# Patient Record
Sex: FEMALE | Race: WHITE | NOT HISPANIC OR LATINO
[De-identification: names, ages, dates, MRNs, and addresses within clinical notes are randomized per-mention and may not be internally consistent; named-entity substitution may affect disease eponyms.]

---

## 2017-02-23 ENCOUNTER — APPOINTMENT (OUTPATIENT)
Dept: SURGICAL ONCOLOGY | Facility: CLINIC | Age: 62
End: 2017-02-23

## 2017-08-16 ENCOUNTER — APPOINTMENT (OUTPATIENT)
Dept: OBGYN | Facility: CLINIC | Age: 62
End: 2017-08-16
Payer: COMMERCIAL

## 2017-08-16 PROCEDURE — 99396 PREV VISIT EST AGE 40-64: CPT

## 2017-08-25 ENCOUNTER — APPOINTMENT (OUTPATIENT)
Dept: RADIOLOGY | Facility: IMAGING CENTER | Age: 62
End: 2017-08-25
Payer: COMMERCIAL

## 2017-08-25 ENCOUNTER — APPOINTMENT (OUTPATIENT)
Dept: ULTRASOUND IMAGING | Facility: IMAGING CENTER | Age: 62
End: 2017-08-25
Payer: COMMERCIAL

## 2017-08-25 ENCOUNTER — OUTPATIENT (OUTPATIENT)
Dept: OUTPATIENT SERVICES | Facility: HOSPITAL | Age: 62
LOS: 1 days | End: 2017-08-25
Payer: COMMERCIAL

## 2017-08-25 DIAGNOSIS — Z00.8 ENCOUNTER FOR OTHER GENERAL EXAMINATION: ICD-10-CM

## 2017-08-25 PROCEDURE — 76536 US EXAM OF HEAD AND NECK: CPT

## 2017-08-25 PROCEDURE — 77080 DXA BONE DENSITY AXIAL: CPT

## 2017-08-25 PROCEDURE — 76536 US EXAM OF HEAD AND NECK: CPT | Mod: 26

## 2017-08-25 PROCEDURE — 77080 DXA BONE DENSITY AXIAL: CPT | Mod: 26

## 2017-09-05 DIAGNOSIS — E04.2 NONTOXIC MULTINODULAR GOITER: ICD-10-CM

## 2017-09-05 DIAGNOSIS — M81.0 AGE-RELATED OSTEOPOROSIS WITHOUT CURRENT PATHOLOGICAL FRACTURE: ICD-10-CM

## 2017-09-11 ENCOUNTER — FORM ENCOUNTER (OUTPATIENT)
Age: 62
End: 2017-09-11

## 2017-09-12 ENCOUNTER — APPOINTMENT (OUTPATIENT)
Dept: ULTRASOUND IMAGING | Facility: IMAGING CENTER | Age: 62
End: 2017-09-12
Payer: COMMERCIAL

## 2017-09-12 ENCOUNTER — APPOINTMENT (OUTPATIENT)
Dept: MAMMOGRAPHY | Facility: IMAGING CENTER | Age: 62
End: 2017-09-12
Payer: COMMERCIAL

## 2017-09-12 ENCOUNTER — OUTPATIENT (OUTPATIENT)
Dept: OUTPATIENT SERVICES | Facility: HOSPITAL | Age: 62
LOS: 1 days | End: 2017-09-12
Payer: COMMERCIAL

## 2017-09-12 DIAGNOSIS — D24.2 BENIGN NEOPLASM OF LEFT BREAST: ICD-10-CM

## 2017-09-12 DIAGNOSIS — R92.2 INCONCLUSIVE MAMMOGRAM: ICD-10-CM

## 2017-09-12 PROCEDURE — 76641 ULTRASOUND BREAST COMPLETE: CPT | Mod: 26,50

## 2017-09-12 PROCEDURE — G0204: CPT | Mod: 26

## 2017-09-12 PROCEDURE — 76641 ULTRASOUND BREAST COMPLETE: CPT

## 2017-09-12 PROCEDURE — G0279: CPT | Mod: 26

## 2017-09-12 PROCEDURE — 77066 DX MAMMO INCL CAD BI: CPT

## 2017-09-12 PROCEDURE — G0279: CPT

## 2017-11-19 ENCOUNTER — EMERGENCY (EMERGENCY)
Facility: HOSPITAL | Age: 62
LOS: 1 days | Discharge: HOME | End: 2017-11-19
Attending: PERSONAL EMERGENCY RESPONSE ATTENDANT | Admitting: PERSONAL EMERGENCY RESPONSE ATTENDANT
Payer: COMMERCIAL

## 2017-11-19 VITALS
HEART RATE: 97 BPM | TEMPERATURE: 98 F | RESPIRATION RATE: 17 BRPM | OXYGEN SATURATION: 100 % | DIASTOLIC BLOOD PRESSURE: 78 MMHG | SYSTOLIC BLOOD PRESSURE: 117 MMHG

## 2017-11-19 PROCEDURE — 99284 EMERGENCY DEPT VISIT MOD MDM: CPT | Mod: 25

## 2017-11-19 PROCEDURE — 73552 X-RAY EXAM OF FEMUR 2/>: CPT | Mod: 26,LT

## 2017-11-19 PROCEDURE — 73562 X-RAY EXAM OF KNEE 3: CPT

## 2017-11-19 PROCEDURE — 73590 X-RAY EXAM OF LOWER LEG: CPT

## 2017-11-19 PROCEDURE — 73552 X-RAY EXAM OF FEMUR 2/>: CPT

## 2017-11-19 PROCEDURE — 73562 X-RAY EXAM OF KNEE 3: CPT | Mod: 26,LT

## 2017-11-19 PROCEDURE — 73590 X-RAY EXAM OF LOWER LEG: CPT | Mod: 26,LT

## 2017-11-19 RX ORDER — OXYCODONE HYDROCHLORIDE 5 MG/1
1 TABLET ORAL
Qty: 12 | Refills: 0 | OUTPATIENT
Start: 2017-11-19 | End: 2017-11-22

## 2017-11-19 RX ORDER — OXYCODONE HYDROCHLORIDE 5 MG/1
5 TABLET ORAL ONCE
Qty: 0 | Refills: 0 | Status: DISCONTINUED | OUTPATIENT
Start: 2017-11-19 | End: 2017-11-19

## 2017-11-19 RX ORDER — ACETAMINOPHEN 500 MG
975 TABLET ORAL ONCE
Qty: 0 | Refills: 0 | Status: COMPLETED | OUTPATIENT
Start: 2017-11-19 | End: 2017-11-19

## 2017-11-19 RX ADMIN — OXYCODONE HYDROCHLORIDE 5 MILLIGRAM(S): 5 TABLET ORAL at 10:23

## 2017-11-19 RX ADMIN — Medication 975 MILLIGRAM(S): at 08:23

## 2017-11-19 NOTE — ED PROVIDER NOTE - OBJECTIVE STATEMENT
Attending MD Diaz.  Pt is a 62 yr old female presenting to ED with complaint of 'knee twist' injury yesterday afternoon.  She reports she was coming down stairs and twisted her L knee.  She remains neurovascularly intact distal to site but is now not able to ambulate or bend knee.  Pt has severe TTP on palpation of L medial knee superior and inferior to joint space.  No gross effusion, no bruising/erythema noted.  Pt has chronic 'knee issues'.  She gets knee injections in bilateral knees for 'lubrication' performed by her orthopedist Dr. Jared Donis.  She denies recent fevers/chills and had actually been remarking how well her knee felt prior to this event yesterday.  No other injuries.  NO fall/head injury.  No pain in L hip/pelvis.  PT's LLE remains neurovascularly intact distal to site.  Unable to actively/passively range L knee.  When attempt at passive ROM pt's L knee feels locked and unable to bend.  No focal TTP over patella.  Unable to assess for laxity.

## 2017-11-19 NOTE — ED PROVIDER NOTE - MEDICAL DECISION MAKING DETAILS
[employer]:  Pt refuses pain medication other than tylenol 2/2 'angioedema' with most medications.  Inability to range knee at all.  No gross deformity.  Plan to obtain XR's and consult ortho for assessment given severely limited ROM.

## 2017-11-19 NOTE — ED ADULT NURSE NOTE - CHPI ED SYMPTOMS NEG
no loss of consciousness/no abrasion/no numbness/no fever/no bleeding/no tingling/no confusion/no vomiting

## 2017-11-19 NOTE — ED PROVIDER NOTE - PROGRESS NOTE DETAILS
[employer]:  Orthopedics consulted re: inability to range L knee at all with high-riding patella on XR.  Pt remains neurovasc intact.  Formal consult requested from orthopedics. [employer]:  Prolonged discussion with pt re: her anaphylaxis hx.  She's had percocet previously without anaphylaxis/allergy.  Pt amenable to oxycodone for added pain control.  Placed in knee immobilizer according to procedure note assoc with this visit.  XR's without gross fx.  Ortho has seen pt and recommends knee immobilizer x 1 wk with follow-up with Dr. Donis.  Pt advised to use tylenol/oxycodone for pain control, rest, ice, elevate.  Return to ED for new worsening sxs including sig swelling, fevers/chills/new/worsening pain.  No current erythema/edema/increased warmth.  Stable for discharge.

## 2017-11-19 NOTE — ED PROCEDURE NOTE - PROCEDURE ADDITIONAL DETAILS
Pt placed in L knee immobilizer and fitted to comfort.  Remains neurovascularly intact distal to site following placement.  Crutches fitted to pt.  Able to ambulate with crutches.

## 2017-11-19 NOTE — CONSULT NOTE ADULT - ASSESSMENT
A: 63 yo Female with L knee pain, likely acute on chronic arthritic pain    -- WBAT LLE  -- Pain control, NSAIDs, rest, ice, compression  -- No acute ortho intervention  -- FU with Dr. Donis in 1-2 weeks after discharge

## 2017-11-19 NOTE — CONSULT NOTE ADULT - SUBJECTIVE AND OBJECTIVE BOX
CC: L wrist pain    HPI: Pt is a 61 yo Female who twisted her leg walking down stairs and developed L knee pain/swelling worsening with ambulation/activity over the past 2 days..  No numbness/tingling/head trauma/other injury.    PMH:  Herpes simplex type 1 infection  Gastroesophageal reflux disease, esophagitis presence not specified  Alopecia      Exam: 61 yo Female in NAD, AAOx3  LLE: SP/DP/S SILT, motor intact TA/GS/EHL, foot WWP, skin intact. + mild effusion L knee, no erythema, no e/o ligamentous laxity, + medial JLT    Xrays: negative for fracture or dislocation

## 2017-11-19 NOTE — ED PROVIDER NOTE - PHYSICAL EXAMINATION
She remains neurovascularly intact distal to site of L knee injury. Pt has severe TTP on palpation of L medial knee superior and inferior to joint space.  No gross effusion, no bruising/erythema noted.   No pain in L hip/pelvis.   Unable to actively/passively range L knee.  When attempt at passive ROM pt's L knee feels locked and unable to bend.  No focal TTP over patella.  Unable to assess for laxity.

## 2017-11-19 NOTE — ED ADULT NURSE NOTE - OBJECTIVE STATEMENT
62 y.o. female presents to ED via EMS 62 y.o. female presents to ED via wheel chair through waiting room c/o left knee pain. History of arthritis & "fluid on knee", sees Dr. EDIL Donis to get rooster comb injections in bilateral knees (last injection was in august). States she had a fall yesterday afternoon down approximately 3 stairs, lost her footing and twisted to catch her self, landed on left knee. Has not taken any pain medication, is wearing knee brace she had at home which has not helped relieve pain. Mild swelling noted. Denies pain at rest, difficult to move, walk or bare weight, pain to palpation of medial aspect of left knee. Denies hitting head/LOC. no bleeding, lacerations, abrasions or bruising noted. Denies HA, CP, SOB, abd pain, NVD, fevers, chills. Patient is awake, alert, a&ox3, following commands, sensory in tact.

## 2017-11-19 NOTE — ED ADULT NURSE NOTE - PMH
Alopecia    Gastroesophageal reflux disease, esophagitis presence not specified    Herpes simplex type 1 infection

## 2018-04-03 ENCOUNTER — APPOINTMENT (OUTPATIENT)
Dept: OBGYN | Facility: CLINIC | Age: 63
End: 2018-04-03
Payer: MEDICARE

## 2018-04-03 PROCEDURE — 99213 OFFICE O/P EST LOW 20 MIN: CPT

## 2018-04-06 ENCOUNTER — APPOINTMENT (OUTPATIENT)
Dept: CT IMAGING | Facility: IMAGING CENTER | Age: 63
End: 2018-04-06

## 2018-04-06 ENCOUNTER — OUTPATIENT (OUTPATIENT)
Dept: OUTPATIENT SERVICES | Facility: HOSPITAL | Age: 63
LOS: 1 days | End: 2018-04-06
Payer: MEDICARE

## 2018-04-06 DIAGNOSIS — Z00.8 ENCOUNTER FOR OTHER GENERAL EXAMINATION: ICD-10-CM

## 2018-04-06 PROCEDURE — 82565 ASSAY OF CREATININE: CPT

## 2018-04-06 PROCEDURE — 74177 CT ABD & PELVIS W/CONTRAST: CPT | Mod: 26

## 2018-04-06 PROCEDURE — 74177 CT ABD & PELVIS W/CONTRAST: CPT

## 2018-04-13 ENCOUNTER — APPOINTMENT (OUTPATIENT)
Dept: ULTRASOUND IMAGING | Facility: IMAGING CENTER | Age: 63
End: 2018-04-13
Payer: MEDICARE

## 2018-04-13 ENCOUNTER — APPOINTMENT (OUTPATIENT)
Dept: MAMMOGRAPHY | Facility: IMAGING CENTER | Age: 63
End: 2018-04-13
Payer: MEDICARE

## 2018-04-13 ENCOUNTER — OUTPATIENT (OUTPATIENT)
Dept: OUTPATIENT SERVICES | Facility: HOSPITAL | Age: 63
LOS: 1 days | End: 2018-04-13
Payer: MEDICARE

## 2018-04-13 DIAGNOSIS — Z00.8 ENCOUNTER FOR OTHER GENERAL EXAMINATION: ICD-10-CM

## 2018-04-13 PROCEDURE — 77065 DX MAMMO INCL CAD UNI: CPT | Mod: 26,LT

## 2018-04-13 PROCEDURE — 76641 ULTRASOUND BREAST COMPLETE: CPT | Mod: 26,50

## 2018-04-13 PROCEDURE — 76641 ULTRASOUND BREAST COMPLETE: CPT

## 2018-04-13 PROCEDURE — 77065 DX MAMMO INCL CAD UNI: CPT

## 2018-04-13 PROCEDURE — G0279: CPT

## 2018-04-13 PROCEDURE — G0279: CPT | Mod: 26

## 2018-04-18 ENCOUNTER — APPOINTMENT (OUTPATIENT)
Dept: MAMMOGRAPHY | Facility: IMAGING CENTER | Age: 63
End: 2018-04-18

## 2018-04-18 ENCOUNTER — OUTPATIENT (OUTPATIENT)
Dept: OUTPATIENT SERVICES | Facility: HOSPITAL | Age: 63
LOS: 1 days | End: 2018-04-18
Payer: MEDICARE

## 2018-04-18 DIAGNOSIS — Z00.8 ENCOUNTER FOR OTHER GENERAL EXAMINATION: ICD-10-CM

## 2018-04-18 PROCEDURE — 19081 BX BREAST 1ST LESION STRTCTC: CPT

## 2018-04-18 PROCEDURE — 19081 BX BREAST 1ST LESION STRTCTC: CPT | Mod: 53

## 2018-10-01 ENCOUNTER — APPOINTMENT (OUTPATIENT)
Dept: MAMMOGRAPHY | Facility: IMAGING CENTER | Age: 63
End: 2018-10-01
Payer: MEDICARE

## 2018-10-01 ENCOUNTER — OUTPATIENT (OUTPATIENT)
Dept: OUTPATIENT SERVICES | Facility: HOSPITAL | Age: 63
LOS: 1 days | End: 2018-10-01
Payer: MEDICARE

## 2018-10-01 ENCOUNTER — APPOINTMENT (OUTPATIENT)
Dept: ULTRASOUND IMAGING | Facility: IMAGING CENTER | Age: 63
End: 2018-10-01
Payer: MEDICARE

## 2018-10-01 DIAGNOSIS — Z00.8 ENCOUNTER FOR OTHER GENERAL EXAMINATION: ICD-10-CM

## 2018-10-01 PROCEDURE — G0279: CPT | Mod: 26

## 2018-10-01 PROCEDURE — 76641 ULTRASOUND BREAST COMPLETE: CPT

## 2018-10-01 PROCEDURE — 77066 DX MAMMO INCL CAD BI: CPT | Mod: 26

## 2018-10-01 PROCEDURE — 76641 ULTRASOUND BREAST COMPLETE: CPT | Mod: 26,50

## 2018-10-01 PROCEDURE — 77066 DX MAMMO INCL CAD BI: CPT

## 2018-10-01 PROCEDURE — G0279: CPT

## 2018-10-08 ENCOUNTER — OUTPATIENT (OUTPATIENT)
Dept: OUTPATIENT SERVICES | Facility: HOSPITAL | Age: 63
LOS: 1 days | End: 2018-10-08
Payer: MEDICARE

## 2018-10-08 ENCOUNTER — APPOINTMENT (OUTPATIENT)
Dept: MRI IMAGING | Facility: IMAGING CENTER | Age: 63
End: 2018-10-08
Payer: MEDICARE

## 2018-10-08 DIAGNOSIS — R92.8 OTHER ABNORMAL AND INCONCLUSIVE FINDINGS ON DIAGNOSTIC IMAGING OF BREAST: ICD-10-CM

## 2018-10-08 PROCEDURE — 0159T: CPT | Mod: 26

## 2018-10-08 PROCEDURE — 82565 ASSAY OF CREATININE: CPT

## 2018-10-08 PROCEDURE — C8908: CPT

## 2018-10-08 PROCEDURE — 77059 MRI BREAST BILATERAL: CPT | Mod: 26

## 2018-10-08 PROCEDURE — A9585: CPT

## 2018-10-08 PROCEDURE — C8937: CPT

## 2018-10-11 ENCOUNTER — OUTPATIENT (OUTPATIENT)
Dept: OUTPATIENT SERVICES | Facility: HOSPITAL | Age: 63
LOS: 1 days | End: 2018-10-11
Payer: MEDICARE

## 2018-10-11 ENCOUNTER — APPOINTMENT (OUTPATIENT)
Dept: ULTRASOUND IMAGING | Facility: IMAGING CENTER | Age: 63
End: 2018-10-11
Payer: MEDICARE

## 2018-10-11 DIAGNOSIS — Z00.8 ENCOUNTER FOR OTHER GENERAL EXAMINATION: ICD-10-CM

## 2018-10-11 DIAGNOSIS — E04.2 NONTOXIC MULTINODULAR GOITER: ICD-10-CM

## 2018-10-11 PROCEDURE — 76536 US EXAM OF HEAD AND NECK: CPT | Mod: 26

## 2018-10-11 PROCEDURE — 76536 US EXAM OF HEAD AND NECK: CPT

## 2018-11-15 ENCOUNTER — APPOINTMENT (OUTPATIENT)
Dept: SURGICAL ONCOLOGY | Facility: CLINIC | Age: 63
End: 2018-11-15
Payer: MEDICARE

## 2018-11-15 VITALS
WEIGHT: 140 LBS | SYSTOLIC BLOOD PRESSURE: 108 MMHG | DIASTOLIC BLOOD PRESSURE: 70 MMHG | HEART RATE: 88 BPM | OXYGEN SATURATION: 97 % | BODY MASS INDEX: 25.76 KG/M2 | HEIGHT: 62 IN | RESPIRATION RATE: 14 BRPM

## 2018-11-15 PROCEDURE — 99213 OFFICE O/P EST LOW 20 MIN: CPT

## 2018-11-20 ENCOUNTER — APPOINTMENT (OUTPATIENT)
Dept: OBGYN | Facility: CLINIC | Age: 63
End: 2018-11-20

## 2019-04-18 ENCOUNTER — APPOINTMENT (OUTPATIENT)
Dept: MAMMOGRAPHY | Facility: IMAGING CENTER | Age: 64
End: 2019-04-18
Payer: MEDICARE

## 2019-04-18 ENCOUNTER — APPOINTMENT (OUTPATIENT)
Dept: ULTRASOUND IMAGING | Facility: IMAGING CENTER | Age: 64
End: 2019-04-18
Payer: MEDICARE

## 2019-04-18 ENCOUNTER — OUTPATIENT (OUTPATIENT)
Dept: OUTPATIENT SERVICES | Facility: HOSPITAL | Age: 64
LOS: 1 days | End: 2019-04-18
Payer: MEDICARE

## 2019-04-18 DIAGNOSIS — R92.8 OTHER ABNORMAL AND INCONCLUSIVE FINDINGS ON DIAGNOSTIC IMAGING OF BREAST: ICD-10-CM

## 2019-04-18 PROCEDURE — 77065 DX MAMMO INCL CAD UNI: CPT

## 2019-04-18 PROCEDURE — G0279: CPT

## 2019-04-18 PROCEDURE — G0279: CPT | Mod: 26

## 2019-04-18 PROCEDURE — 76641 ULTRASOUND BREAST COMPLETE: CPT | Mod: 26,LT

## 2019-04-18 PROCEDURE — 77065 DX MAMMO INCL CAD UNI: CPT | Mod: 26,LT

## 2019-04-18 PROCEDURE — 76641 ULTRASOUND BREAST COMPLETE: CPT

## 2019-05-24 ENCOUNTER — APPOINTMENT (OUTPATIENT)
Dept: OTOLARYNGOLOGY | Facility: CLINIC | Age: 64
End: 2019-05-24
Payer: MEDICARE

## 2019-05-24 VITALS
HEIGHT: 62 IN | SYSTOLIC BLOOD PRESSURE: 114 MMHG | BODY MASS INDEX: 25.76 KG/M2 | WEIGHT: 140 LBS | DIASTOLIC BLOOD PRESSURE: 70 MMHG | HEART RATE: 79 BPM

## 2019-05-24 PROCEDURE — 69210 REMOVE IMPACTED EAR WAX UNI: CPT

## 2019-05-24 PROCEDURE — 99214 OFFICE O/P EST MOD 30 MIN: CPT | Mod: 25

## 2019-05-24 NOTE — HISTORY OF PRESENT ILLNESS
[de-identified] : Patient is here today after being advised that she had issues with wax in the ears. She does not have any pain in the ears but may need a hearing test although does not have any time today. SHe does not have any current nasal congsetion or runny nose. SHe is overall doing well. She  does not clean her ears at home.

## 2019-05-24 NOTE — ASSESSMENT
[FreeTextEntry1] : Patient follows up mass of cerumen impaction right ear was curetted out will followup in the future for a hearing test otherwise will follow up annually

## 2019-08-22 ENCOUNTER — FORM ENCOUNTER (OUTPATIENT)
Age: 64
End: 2019-08-22

## 2019-08-23 ENCOUNTER — APPOINTMENT (OUTPATIENT)
Dept: OBGYN | Facility: CLINIC | Age: 64
End: 2019-08-23
Payer: MEDICARE

## 2019-08-23 ENCOUNTER — RESULT REVIEW (OUTPATIENT)
Age: 64
End: 2019-08-23

## 2019-08-23 PROCEDURE — G0101: CPT

## 2019-08-25 ENCOUNTER — FORM ENCOUNTER (OUTPATIENT)
Age: 64
End: 2019-08-25

## 2019-08-26 ENCOUNTER — APPOINTMENT (OUTPATIENT)
Dept: ALLERGY | Facility: CLINIC | Age: 64
End: 2019-08-26
Payer: MEDICARE

## 2019-08-26 VITALS
OXYGEN SATURATION: 98 % | BODY MASS INDEX: 25.76 KG/M2 | SYSTOLIC BLOOD PRESSURE: 110 MMHG | HEART RATE: 82 BPM | RESPIRATION RATE: 14 BRPM | DIASTOLIC BLOOD PRESSURE: 73 MMHG | WEIGHT: 140 LBS | HEIGHT: 62 IN

## 2019-08-26 PROCEDURE — 99204 OFFICE O/P NEW MOD 45 MIN: CPT

## 2019-08-26 NOTE — PHYSICAL EXAM
[Alert] : alert [Well Nourished] : well nourished [Healthy Appearance] : healthy appearance [No Acute Distress] : no acute distress [Normal Pupil & Iris Size/Symmetry] : normal pupil and iris size and symmetry [Well Developed] : well developed [No Discharge] : no discharge [No Photophobia] : no photophobia [Sclera Not Icteric] : sclera not icteric [Normal Nasal Mucosa] : the nasal mucosa was normal [Normal Lips/Tongue] : the lips and tongue were normal [Normal Tonsils] : normal tonsils [Normal Dentition] : normal dentition [No Oral Lesions or Ulcers] : no oral lesions or ulcers [No Neck Mass] : no neck mass was observed [No LAD] : no lymphadenopathy [No Thyroid Mass] : no thyroid mass [Supple] : the neck was supple [Normal Rate and Effort] : normal respiratory rhythm and effort [No Crackles] : no crackles [Bilateral Audible Breath Sounds] : bilateral audible breath sounds [Normal S1, S2] : normal S1 and S2 [Normal Rate] : heart rate was normal  [No murmur] : no murmur [Regular Rhythm] : with a regular rhythm [Normal Cervical Lymph Nodes] : cervical [No Rash] : no rash [Skin Intact] : skin intact  [No clubbing] : no clubbing [No Skin Lesions] : no skin lesions [No Joint Swelling or Erythema] : no joint swelling or erythema [No Edema] : no edema [No Cyanosis] : no cyanosis [Normal Affect] : affect was normal [Normal Mood] : mood was normal [Alert, Awake, Oriented as Age-Appropriate] : alert, awake, oriented as age appropriate

## 2019-09-23 ENCOUNTER — APPOINTMENT (OUTPATIENT)
Dept: ALLERGY | Facility: CLINIC | Age: 64
End: 2019-09-23
Payer: MEDICARE

## 2019-09-23 VITALS
DIASTOLIC BLOOD PRESSURE: 79 MMHG | HEART RATE: 77 BPM | HEIGHT: 62 IN | RESPIRATION RATE: 14 BRPM | SYSTOLIC BLOOD PRESSURE: 100 MMHG | OXYGEN SATURATION: 97 % | BODY MASS INDEX: 25.76 KG/M2 | WEIGHT: 140 LBS

## 2019-09-23 PROCEDURE — 99214 OFFICE O/P EST MOD 30 MIN: CPT

## 2019-09-23 RX ORDER — SPIRONOLACTONE 50 MG/1
TABLET ORAL
Refills: 0 | Status: ACTIVE | COMMUNITY

## 2019-09-23 NOTE — HISTORY OF PRESENT ILLNESS
[Asthma] : asthma [Eczematous rashes] : eczematous rashes [Allergic Rhinitis] : allergic rhinitis [Venom Reactions] : venom reactions [Food Allergies] : food allergies [de-identified] : No episodes of swelling since starting Zyrtec 20 mg BID - she has had some mild itching on her flanks which resolves with topical steroid creams.

## 2019-09-23 NOTE — ASSESSMENT
[FreeTextEntry1] : Idiopathic angioedema well controlled with Zyrtec 20 mg BID:\par \par Patient to continue with present medical regimen\par RV 3 months.

## 2019-09-23 NOTE — PHYSICAL EXAM
[Alert] : alert [Well Nourished] : well nourished [Healthy Appearance] : healthy appearance [No Acute Distress] : no acute distress [Well Developed] : well developed [Normal Pupil & Iris Size/Symmetry] : normal pupil and iris size and symmetry [No Discharge] : no discharge [No Photophobia] : no photophobia [Sclera Not Icteric] : sclera not icteric [Normal Nasal Mucosa] : the nasal mucosa was normal [Normal Lips/Tongue] : the lips and tongue were normal [Normal Tonsils] : normal tonsils [Normal Dentition] : normal dentition [No Neck Mass] : no neck mass was observed [No Oral Lesions or Ulcers] : no oral lesions or ulcers [No LAD] : no lymphadenopathy [No Thyroid Mass] : no thyroid mass [Supple] : the neck was supple [Normal Rate and Effort] : normal respiratory rhythm and effort [No Crackles] : no crackles [Bilateral Audible Breath Sounds] : bilateral audible breath sounds [Normal Rate] : heart rate was normal  [Normal S1, S2] : normal S1 and S2 [No murmur] : no murmur [Regular Rhythm] : with a regular rhythm [Normal Cervical Lymph Nodes] : cervical [Skin Intact] : skin intact  [No Rash] : no rash [No Joint Swelling or Erythema] : no joint swelling or erythema [No Skin Lesions] : no skin lesions [No clubbing] : no clubbing [No Edema] : no edema [No Cyanosis] : no cyanosis [Normal Mood] : mood was normal [Normal Affect] : affect was normal [Alert, Awake, Oriented as Age-Appropriate] : alert, awake, oriented as age appropriate

## 2019-09-27 NOTE — CONSULT LETTER
[Thank you for referring [unfilled] for consultation for _____] : Thank you for referring [unfilled] for consultation for [unfilled] [Dear  ___] : Dear  [unfilled], [Please see my note below.] : Please see my note below. [Sincerely,] : Sincerely, [FreeTextEntry3] : Mitchell B. Boxer, M.D., FAAAAI\par [facility]\par \par Department of Allergy-Immunology\par [facility] \par [address] [place] [address]\par [place] [address]\par Tel:   [phone]\par Fax:  [phone]\par Email: [email or web address]\par

## 2019-09-27 NOTE — ASSESSMENT
[FreeTextEntry1] : Recurrent angioedema:\par \par Continue Zyrtec 20 mg BID x 4 weeks \par Patient can take Avodart or spironolactone \par RV 1 month

## 2019-12-02 ENCOUNTER — APPOINTMENT (OUTPATIENT)
Dept: ALLERGY | Facility: CLINIC | Age: 64
End: 2019-12-02
Payer: MEDICARE

## 2019-12-02 DIAGNOSIS — Z23 ENCOUNTER FOR IMMUNIZATION: ICD-10-CM

## 2019-12-02 PROCEDURE — 99214 OFFICE O/P EST MOD 30 MIN: CPT | Mod: 25

## 2019-12-02 PROCEDURE — G0008: CPT

## 2019-12-02 PROCEDURE — 90662 IIV NO PRSV INCREASED AG IM: CPT

## 2019-12-02 NOTE — HISTORY OF PRESENT ILLNESS
[Asthma] : asthma [Allergic Rhinitis] : allergic rhinitis [Eczematous rashes] : eczematous rashes [Venom Reactions] : venom reactions [Food Allergies] : food allergies [de-identified] : Patient has been taking Zyrtec 20 mg BID ongoing with no recurrent episodes of swelling.   She is taking spironolactone with no problems.  Feeling extremely well.

## 2019-12-02 NOTE — PHYSICAL EXAM
[Alert] : alert [Well Nourished] : well nourished [Healthy Appearance] : healthy appearance [No Acute Distress] : no acute distress [Well Developed] : well developed [Normal Pupil & Iris Size/Symmetry] : normal pupil and iris size and symmetry [No Discharge] : no discharge [No Photophobia] : no photophobia [Sclera Not Icteric] : sclera not icteric [Normal Nasal Mucosa] : the nasal mucosa was normal [Normal Lips/Tongue] : the lips and tongue were normal [Normal Tonsils] : normal tonsils [Normal Dentition] : normal dentition [No Oral Lesions or Ulcers] : no oral lesions or ulcers [No LAD] : no lymphadenopathy [No Neck Mass] : no neck mass was observed [No Thyroid Mass] : no thyroid mass [Supple] : the neck was supple [Normal Rate and Effort] : normal respiratory rhythm and effort [No Crackles] : no crackles [Bilateral Audible Breath Sounds] : bilateral audible breath sounds [Normal Rate] : heart rate was normal  [Normal S1, S2] : normal S1 and S2 [No murmur] : no murmur [Regular Rhythm] : with a regular rhythm [Normal Cervical Lymph Nodes] : cervical [Skin Intact] : skin intact  [No Rash] : no rash [No Skin Lesions] : no skin lesions [No Joint Swelling or Erythema] : no joint swelling or erythema [No clubbing] : no clubbing [No Edema] : no edema [No Cyanosis] : no cyanosis [Normal Mood] : mood was normal [Normal Affect] : affect was normal [Alert, Awake, Oriented as Age-Appropriate] : alert, awake, oriented as age appropriate

## 2019-12-02 NOTE — ASSESSMENT
[FreeTextEntry1] : Idiopathic angioedema:\par \par Lower Zyrtec to 10 mg BID\par Flu shot today\par RV 4 months or prn

## 2019-12-18 ENCOUNTER — APPOINTMENT (OUTPATIENT)
Dept: ALLERGY | Facility: CLINIC | Age: 64
End: 2019-12-18
Payer: MEDICARE

## 2019-12-18 VITALS
SYSTOLIC BLOOD PRESSURE: 110 MMHG | WEIGHT: 140 LBS | BODY MASS INDEX: 25.76 KG/M2 | DIASTOLIC BLOOD PRESSURE: 80 MMHG | HEART RATE: 72 BPM | HEIGHT: 62 IN

## 2019-12-18 PROCEDURE — 99214 OFFICE O/P EST MOD 30 MIN: CPT

## 2019-12-18 RX ORDER — HYDROXYZINE HYDROCHLORIDE 25 MG/1
25 TABLET ORAL
Qty: 90 | Refills: 1 | Status: ACTIVE | COMMUNITY
Start: 2019-12-18 | End: 1900-01-01

## 2019-12-18 NOTE — PHYSICAL EXAM
[Alert] : alert [Well Nourished] : well nourished [No Acute Distress] : no acute distress [Healthy Appearance] : healthy appearance [Well Developed] : well developed [Normal Pupil & Iris Size/Symmetry] : normal pupil and iris size and symmetry [No Discharge] : no discharge [No Photophobia] : no photophobia [Normal Nasal Mucosa] : the nasal mucosa was normal [Sclera Not Icteric] : sclera not icteric [Normal Lips/Tongue] : the lips and tongue were normal [Normal Dentition] : normal dentition [Normal Tonsils] : normal tonsils [No Neck Mass] : no neck mass was observed [No LAD] : no lymphadenopathy [No Oral Lesions or Ulcers] : no oral lesions or ulcers [No Thyroid Mass] : no thyroid mass [Normal Rate and Effort] : normal respiratory rhythm and effort [Supple] : the neck was supple [No Crackles] : no crackles [Bilateral Audible Breath Sounds] : bilateral audible breath sounds [No murmur] : no murmur [Normal Rate] : heart rate was normal  [Normal S1, S2] : normal S1 and S2 [Normal Cervical Lymph Nodes] : cervical [Regular Rhythm] : with a regular rhythm [Skin Intact] : skin intact  [No Rash] : no rash [No Skin Lesions] : no skin lesions [No clubbing] : no clubbing [No Joint Swelling or Erythema] : no joint swelling or erythema [No Edema] : no edema [No Cyanosis] : no cyanosis [Normal Affect] : affect was normal [Normal Mood] : mood was normal [Alert, Awake, Oriented as Age-Appropriate] : alert, awake, oriented as age appropriate

## 2020-01-08 NOTE — ASSESSMENT
[FreeTextEntry1] : Idiopathic angioedema not controlled with very high dose Zyrtec (up to 20 mg QID): \par \par Zyrtec 20 mg BID \par Will need to add additional antihistamines in order to prevent a more severe episode of angioedema - patient to begin hydroxyzine 25 mg QHS \par RV 2 months or prn

## 2020-01-08 NOTE — HISTORY OF PRESENT ILLNESS
[Asthma] : asthma [Allergic Rhinitis] : allergic rhinitis [Eczematous rashes] : eczematous rashes [Food Allergies] : food allergies [Venom Reactions] : venom reactions [de-identified] : Feet swelling - tongue tingling - side pain - she increased Zyrtec to 20 mg TID to QID - very distracted with problems in [place].   She is presently taking Zyrtec 20 mg TID with better control of her symptoms.

## 2020-01-20 ENCOUNTER — APPOINTMENT (OUTPATIENT)
Dept: ALLERGY | Facility: CLINIC | Age: 65
End: 2020-01-20
Payer: MEDICARE

## 2020-01-20 VITALS
DIASTOLIC BLOOD PRESSURE: 70 MMHG | OXYGEN SATURATION: 98 % | SYSTOLIC BLOOD PRESSURE: 110 MMHG | HEART RATE: 81 BPM | RESPIRATION RATE: 16 BRPM

## 2020-01-20 PROCEDURE — 99214 OFFICE O/P EST MOD 30 MIN: CPT

## 2020-01-20 NOTE — HISTORY OF PRESENT ILLNESS
[Asthma] : asthma [Eczematous rashes] : eczematous rashes [Venom Reactions] : venom reactions [Food Allergies] : food allergies [de-identified] : Patient taking Zyrtec 10 mg BID now - the hydroxyzine was not approved.   Patient has appointment with rheumatologist for joint pain in her hands.    Patient concerned about swelling and discoloration

## 2020-01-20 NOTE — ASSESSMENT
[FreeTextEntry1] : Angioedema - well controlled with Zyrtec 10 mg BID. \par \par She will continue with Zyrtec 10 mg BID x 3 months and if doing well lower to QD\par RV recurrent swelling\par Patient to see podiatrist for toe discoloration.

## 2020-01-20 NOTE — PHYSICAL EXAM
[Well Nourished] : well nourished [Alert] : alert [No Acute Distress] : no acute distress [Healthy Appearance] : healthy appearance [Normal Pupil & Iris Size/Symmetry] : normal pupil and iris size and symmetry [Well Developed] : well developed [No Discharge] : no discharge [No Photophobia] : no photophobia [Sclera Not Icteric] : sclera not icteric [Normal Nasal Mucosa] : the nasal mucosa was normal [Normal Lips/Tongue] : the lips and tongue were normal [Normal Dentition] : normal dentition [Normal Tonsils] : normal tonsils [No Oral Lesions or Ulcers] : no oral lesions or ulcers [No Neck Mass] : no neck mass was observed [No LAD] : no lymphadenopathy [No Thyroid Mass] : no thyroid mass [Normal Rate and Effort] : normal respiratory rhythm and effort [No Crackles] : no crackles [Supple] : the neck was supple [Bilateral Audible Breath Sounds] : bilateral audible breath sounds [Normal Rate] : heart rate was normal  [Normal S1, S2] : normal S1 and S2 [Normal Cervical Lymph Nodes] : cervical [No murmur] : no murmur [Regular Rhythm] : with a regular rhythm [No Joint Swelling or Erythema] : no joint swelling or erythema [Skin Intact] : skin intact  [No clubbing] : no clubbing [No Cyanosis] : no cyanosis [No Edema] : no edema [Alert, Awake, Oriented as Age-Appropriate] : alert, awake, oriented as age appropriate [Normal Affect] : affect was normal [Normal Mood] : mood was normal [de-identified] : mild bluish discoloration on dorsum of toes.

## 2020-05-07 ENCOUNTER — APPOINTMENT (OUTPATIENT)
Dept: ALLERGY | Facility: CLINIC | Age: 65
End: 2020-05-07
Payer: MEDICARE

## 2020-05-07 VITALS
DIASTOLIC BLOOD PRESSURE: 74 MMHG | SYSTOLIC BLOOD PRESSURE: 129 MMHG | HEIGHT: 62 IN | RESPIRATION RATE: 16 BRPM | HEART RATE: 86 BPM | OXYGEN SATURATION: 97 %

## 2020-05-07 PROCEDURE — 99213 OFFICE O/P EST LOW 20 MIN: CPT

## 2020-05-07 RX ORDER — OLOPATADINE HYDROCHLORIDE 2 MG/ML
0.2 SOLUTION OPHTHALMIC DAILY
Qty: 1 | Refills: 2 | Status: ACTIVE | COMMUNITY
Start: 2020-05-07 | End: 1900-01-01

## 2020-05-07 NOTE — HISTORY OF PRESENT ILLNESS
[Asthma] : asthma [Eczematous rashes] : eczematous rashes [Venom Reactions] : venom reactions [Food Allergies] : food allergies [de-identified] : She saw podiatrist and no treatment needed.    She has been doing well - she has been taking Zyrtec prn now - she notices a tingling and she takes medications and symptoms resolve.   She also takes Zyrtec for seasonal allergies.   She uses OTC eyedrops for ocular itching.

## 2020-05-07 NOTE — PHYSICAL EXAM
[Alert] : alert [Well Nourished] : well nourished [Healthy Appearance] : healthy appearance [No Acute Distress] : no acute distress [Well Developed] : well developed [Normal Pupil & Iris Size/Symmetry] : normal pupil and iris size and symmetry [No Discharge] : no discharge [No Photophobia] : no photophobia [Sclera Not Icteric] : sclera not icteric [Normal Lips/Tongue] : the lips and tongue were normal [Normal Nasal Mucosa] : the nasal mucosa was normal [Normal Tonsils] : normal tonsils [Normal Dentition] : normal dentition [No Oral Lesions or Ulcers] : no oral lesions or ulcers [No Thyroid Mass] : no thyroid mass [No Neck Mass] : no neck mass was observed [No LAD] : no lymphadenopathy [Supple] : the neck was supple [Normal Rate and Effort] : normal respiratory rhythm and effort [No Crackles] : no crackles [Bilateral Audible Breath Sounds] : bilateral audible breath sounds [Normal Rate] : heart rate was normal  [Normal S1, S2] : normal S1 and S2 [No murmur] : no murmur [Regular Rhythm] : with a regular rhythm [Normal Cervical Lymph Nodes] : cervical [Skin Intact] : skin intact  [No Joint Swelling or Erythema] : no joint swelling or erythema [No clubbing] : no clubbing [No Edema] : no edema [No Cyanosis] : no cyanosis [Normal Affect] : affect was normal [Normal Mood] : mood was normal [Alert, Awake, Oriented as Age-Appropriate] : alert, awake, oriented as age appropriate [de-identified] : mild bluish discoloration on dorsum of toes.

## 2020-05-07 NOTE — ASSESSMENT
[FreeTextEntry1] : Idiopathic angioedema:\par \par Well controlled with prn Zyrtec\par \par Seasonal allergic rhinoconjunctivitis:\par \par Zyrtec QD\par Pataday QD \par \par RV prn symptoms

## 2020-05-13 ENCOUNTER — APPOINTMENT (OUTPATIENT)
Dept: ALLERGY | Facility: CLINIC | Age: 65
End: 2020-05-13
Payer: MEDICARE

## 2020-05-13 PROCEDURE — 99213 OFFICE O/P EST LOW 20 MIN: CPT | Mod: 95

## 2020-05-13 NOTE — HISTORY OF PRESENT ILLNESS
[Home] : at home, [unfilled] , at the time of the visit. 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   She does report her and her  having a GI infection in February with associated fever and wanted to know if that was potentially COVID.

## 2020-05-13 NOTE — ASSESSMENT
[FreeTextEntry1] : Idiopathic angioedema well controlled with Zyrtec 10 mg QD.   I have advised patient that COVID exposure will not cause worsening of her angioedema and continue present medical therapy. \par \par Patient will have COVID antibody testing performed.\par \par This visit was provided via telehealth using real time 2-way audio visual technology.  The patient, Alma Rosa Awan, was located at home, at the time of the visit.   The provider, Mitchell Boxer, M.D., was located at the office, [address] [place] [address], at the time of the visit.\par \par The patient, Alma Rosa Awan and physician, Mitchell Boxer, M.D., participated in the telehealth encounter.\par \par Verbal consent obtained by  from patient.\par \par The majority of time (>50%) was spent on counseling and coordination of care with this patient and/or family member.  The approximate physician face to face time was 15 minutes for the diagnosis of idiopathic angioedema. \par

## 2020-05-13 NOTE — PHYSICAL EXAM
[Well Nourished] : well nourished [Healthy Appearance] : healthy appearance [Alert] : alert [Well Developed] : well developed [No Acute Distress] : no acute distress [Normal Mood] : mood was normal [Wheezing] : no wheezing was heard [Normal Voice/Communication] : normal voice communication [Normal Affect] : affect was normal [Judgment and Insight Age Appropriate] : judgement and insight is age appropriate [Alert, Awake, Oriented as Age-Appropriate] : alert, awake, oriented as age appropriate [de-identified] : no audible wheezing

## 2020-05-15 LAB
SARS-COV-2 IGG SERPL IA-ACNC: <0.1 INDEX
SARS-COV-2 IGG SERPL QL IA: NEGATIVE

## 2020-10-28 ENCOUNTER — FORM ENCOUNTER (OUTPATIENT)
Age: 65
End: 2020-10-28

## 2020-12-16 ENCOUNTER — RESULT REVIEW (OUTPATIENT)
Age: 65
End: 2020-12-16

## 2020-12-16 ENCOUNTER — APPOINTMENT (OUTPATIENT)
Dept: MAMMOGRAPHY | Facility: IMAGING CENTER | Age: 65
End: 2020-12-16
Payer: MEDICARE

## 2020-12-16 ENCOUNTER — FORM ENCOUNTER (OUTPATIENT)
Age: 65
End: 2020-12-16

## 2020-12-16 ENCOUNTER — OUTPATIENT (OUTPATIENT)
Dept: OUTPATIENT SERVICES | Facility: HOSPITAL | Age: 65
LOS: 1 days | End: 2020-12-16
Payer: MEDICARE

## 2020-12-16 ENCOUNTER — APPOINTMENT (OUTPATIENT)
Dept: ULTRASOUND IMAGING | Facility: IMAGING CENTER | Age: 65
End: 2020-12-16
Payer: MEDICARE

## 2020-12-16 DIAGNOSIS — Z12.39 ENCOUNTER FOR OTHER SCREENING FOR MALIGNANT NEOPLASM OF BREAST: ICD-10-CM

## 2020-12-16 DIAGNOSIS — R92.2 INCONCLUSIVE MAMMOGRAM: ICD-10-CM

## 2020-12-16 PROCEDURE — 77067 SCR MAMMO BI INCL CAD: CPT

## 2020-12-16 PROCEDURE — 77063 BREAST TOMOSYNTHESIS BI: CPT | Mod: 26

## 2020-12-16 PROCEDURE — 76641 ULTRASOUND BREAST COMPLETE: CPT | Mod: 26,50

## 2020-12-16 PROCEDURE — 76641 ULTRASOUND BREAST COMPLETE: CPT

## 2020-12-16 PROCEDURE — 77063 BREAST TOMOSYNTHESIS BI: CPT

## 2020-12-16 PROCEDURE — 77067 SCR MAMMO BI INCL CAD: CPT | Mod: 26

## 2021-09-02 ENCOUNTER — APPOINTMENT (OUTPATIENT)
Dept: ALLERGY | Facility: CLINIC | Age: 66
End: 2021-09-02

## 2021-09-22 ENCOUNTER — NON-APPOINTMENT (OUTPATIENT)
Age: 66
End: 2021-09-22

## 2021-09-22 DIAGNOSIS — Z92.89 PERSONAL HISTORY OF OTHER MEDICAL TREATMENT: ICD-10-CM

## 2021-09-22 DIAGNOSIS — Z78.9 OTHER SPECIFIED HEALTH STATUS: ICD-10-CM

## 2021-09-22 RX ORDER — RANITIDINE HYDROCHLORIDE 300 MG/1
TABLET, FILM COATED ORAL
Refills: 0 | Status: ACTIVE | COMMUNITY

## 2021-09-22 RX ORDER — CETIRIZINE HCL 10 MG
TABLET ORAL
Refills: 0 | Status: ACTIVE | COMMUNITY

## 2021-11-03 ENCOUNTER — APPOINTMENT (OUTPATIENT)
Dept: OBGYN | Facility: CLINIC | Age: 66
End: 2021-11-03
Payer: MEDICARE

## 2021-11-03 VITALS
DIASTOLIC BLOOD PRESSURE: 62 MMHG | BODY MASS INDEX: 23.86 KG/M2 | HEIGHT: 62.5 IN | SYSTOLIC BLOOD PRESSURE: 102 MMHG | WEIGHT: 133 LBS

## 2021-11-03 DIAGNOSIS — Z01.419 ENCOUNTER FOR GYNECOLOGICAL EXAMINATION (GENERAL) (ROUTINE) W/OUT ABNORMAL FINDINGS: ICD-10-CM

## 2021-11-03 DIAGNOSIS — Z11.51 ENCOUNTER FOR SCREENING FOR HUMAN PAPILLOMAVIRUS (HPV): ICD-10-CM

## 2021-11-03 DIAGNOSIS — Z00.00 ENCOUNTER FOR GENERAL ADULT MEDICAL EXAMINATION W/OUT ABNORMAL FINDINGS: ICD-10-CM

## 2021-11-03 PROCEDURE — G0101: CPT

## 2021-11-03 NOTE — PHYSICAL EXAM
[Appropriately responsive] : appropriately responsive [Alert] : alert [No Acute Distress] : no acute distress [No Lymphadenopathy] : no lymphadenopathy [Thyroid Nodule] : thyroid nodule [Goiter] : goiter [Examination Of The Breasts] : a normal appearance [Breast Nipple Inversion] : inverted [No Masses] : no breast masses were palpable [Labia Majora] : normal [Labia Minora] : normal [Normal] : normal [FreeTextEntry9] : deferred

## 2021-11-03 NOTE — DISCUSSION/SUMMARY
[FreeTextEntry1] : health care maintenance\par -pap\par -vit D/exercise/ BMD\par -breast mammo/sono\par -colon screening reviewed\par -f/u PCP for annual and appropriate immunizations\par -rto 1 year\par -s/p covid vaccine/booster

## 2022-02-10 ENCOUNTER — RESULT REVIEW (OUTPATIENT)
Age: 67
End: 2022-02-10

## 2022-02-10 ENCOUNTER — APPOINTMENT (OUTPATIENT)
Dept: ULTRASOUND IMAGING | Facility: IMAGING CENTER | Age: 67
End: 2022-02-10
Payer: MEDICARE

## 2022-02-10 ENCOUNTER — APPOINTMENT (OUTPATIENT)
Dept: MAMMOGRAPHY | Facility: IMAGING CENTER | Age: 67
End: 2022-02-10
Payer: MEDICARE

## 2022-02-10 ENCOUNTER — APPOINTMENT (OUTPATIENT)
Dept: RADIOLOGY | Facility: IMAGING CENTER | Age: 67
End: 2022-02-10
Payer: MEDICARE

## 2022-02-10 ENCOUNTER — OUTPATIENT (OUTPATIENT)
Dept: OUTPATIENT SERVICES | Facility: HOSPITAL | Age: 67
LOS: 1 days | End: 2022-02-10
Payer: MEDICARE

## 2022-02-10 DIAGNOSIS — Z00.8 ENCOUNTER FOR OTHER GENERAL EXAMINATION: ICD-10-CM

## 2022-02-10 PROCEDURE — 77080 DXA BONE DENSITY AXIAL: CPT

## 2022-02-10 PROCEDURE — 77067 SCR MAMMO BI INCL CAD: CPT

## 2022-02-10 PROCEDURE — 76641 ULTRASOUND BREAST COMPLETE: CPT | Mod: 26,50

## 2022-02-10 PROCEDURE — 76641 ULTRASOUND BREAST COMPLETE: CPT

## 2022-02-10 PROCEDURE — 77067 SCR MAMMO BI INCL CAD: CPT | Mod: 26

## 2022-02-10 PROCEDURE — 77063 BREAST TOMOSYNTHESIS BI: CPT | Mod: 26

## 2022-02-10 PROCEDURE — 77080 DXA BONE DENSITY AXIAL: CPT | Mod: 26

## 2022-02-10 PROCEDURE — 77063 BREAST TOMOSYNTHESIS BI: CPT

## 2022-06-30 NOTE — HISTORY OF PRESENT ILLNESS
[FreeTextEntry1] : Pt is 67yo  postmen presents for annual no complaints. no abnml bleeding/discharge.
Attending Attestation (For Attendings USE Only)...

## 2022-08-06 LAB
CYTOLOGY CVX/VAG DOC THIN PREP: ABNORMAL
HPV HIGH+LOW RISK DNA PNL CVX: NOT DETECTED

## 2022-08-10 ENCOUNTER — APPOINTMENT (OUTPATIENT)
Dept: OBGYN | Facility: CLINIC | Age: 67
End: 2022-08-10

## 2022-08-10 VITALS
HEIGHT: 62 IN | DIASTOLIC BLOOD PRESSURE: 80 MMHG | SYSTOLIC BLOOD PRESSURE: 125 MMHG | BODY MASS INDEX: 24.84 KG/M2 | WEIGHT: 135 LBS

## 2022-08-10 DIAGNOSIS — N64.4 MASTODYNIA: ICD-10-CM

## 2022-08-10 PROCEDURE — 99213 OFFICE O/P EST LOW 20 MIN: CPT

## 2022-08-10 RX ORDER — ESTRADIOL 0.1 MG/G
0.1 CREAM VAGINAL
Qty: 1 | Refills: 3 | Status: ACTIVE | COMMUNITY
Start: 2022-08-10 | End: 1900-01-01

## 2022-08-10 NOTE — PHYSICAL EXAM
[Appropriately responsive] : appropriately responsive [Alert] : alert [No Acute Distress] : no acute distress [No Lymphadenopathy] : no lymphadenopathy [Soft] : soft [Non-tender] : non-tender [Non-distended] : non-distended [No HSM] : No HSM [No Lesions] : no lesions [No Mass] : no mass [Oriented x3] : oriented x3 [Examination Of The Breasts] : a normal appearance [No Masses] : no breast masses were palpable [Labia Majora] : normal [Labia Minora] : normal [Atrophy] : atrophy [Normal] : normal [Uterine Adnexae] : normal [FreeTextEntry4] : atrophic vaginitis

## 2022-08-10 NOTE — HISTORY OF PRESENT ILLNESS
[Patient reported PAP Smear was abnormal] : Patient reported PAP Smear was abnormal [FreeTextEntry1] : 68 y/o  postmenopausal female for atrophic vaginitis and left breast discomfort. Last seen 11/3/21 for annual, abn pap. Pt reports left breast and chest discomfort occasionally lasting a couple seconds with no worsening, alleviated or associated symptoms except recent "hot flashes". Recent mammogram within limits. Pt also reports vaginal dryness. \par \par GynHx: ab pap\par PmHx: angioedema, endometriosis \par PsHx: ovarian cystectomy, \par FamHx: malignant neoplasm of thyroid (sibling), myocardial infarction (father) \par current meds: hydrOXYzine HCl\par NKDA [PapSmeardate] : 11/2021

## 2022-08-10 NOTE — END OF VISIT
[FreeTextEntry3] : I, Maddie Trinity acted as a scribe on behalf of Dr. Robert Mccurdy on 08/10/2022 .\par \par All medical entries made by the scribe were at my, Dr. Robert Mccurdy, direction and personally dictated by me on 08/10/2022. I have reviewed the chart and agree that the record accurately reflects my personal performance of the history, physical exam, assessment and plan. I have also personally directed, reviewed, and agreed with the chart.\par

## 2022-08-10 NOTE — PLAN
[FreeTextEntry1] : 66 y/o  postmenopausal female for atrophic vaginitis and left breast discomfort. \par \par postmeno atrophic vaginitis\par -discussed hormonal and no hormonal management\par -trial of estradiol, rx given, risks/benefits discussed\par -rto 3 months to review and evaluate atrophic vaginitis \par \par Osteoporosis on BMD \par -referral to endocrine for management/ bisphosphonate\par - vitamin d advised\par \par left breast chest discomfort\par - refer to cardiology today to rule out cards etiology, pt instructed to call if not able to make appt asap\par -recent mammo w normal limits\par -follow up breast surgeon if no etiology due to of hx of intraductal papilloma.

## 2022-08-10 NOTE — REASON FOR VISIT
[Consultation] : consultation for [FreeTextEntry2] : patient c/o hot flashes, pain on left breast, and vaginal dryness

## 2022-10-28 ENCOUNTER — APPOINTMENT (OUTPATIENT)
Dept: OTOLARYNGOLOGY | Facility: CLINIC | Age: 67
End: 2022-10-28

## 2022-12-05 ENCOUNTER — APPOINTMENT (OUTPATIENT)
Dept: OBGYN | Facility: CLINIC | Age: 67
End: 2022-12-05

## 2022-12-05 VITALS
SYSTOLIC BLOOD PRESSURE: 122 MMHG | BODY MASS INDEX: 24.84 KG/M2 | WEIGHT: 135 LBS | DIASTOLIC BLOOD PRESSURE: 82 MMHG | HEIGHT: 62 IN

## 2022-12-05 DIAGNOSIS — N95.2 POSTMENOPAUSAL ATROPHIC VAGINITIS: ICD-10-CM

## 2022-12-05 PROCEDURE — 99213 OFFICE O/P EST LOW 20 MIN: CPT

## 2022-12-05 NOTE — END OF VISIT
[FreeTextEntry3] : I, Kushal Mejia, acted as a scribe on behalf of Dr. Robert Mccurdy on 12/05/2022 .\par \par All medical entries made by the scribe were at my, Dr. Robert Mccurdy's, direction and personally dictated by me on 12/05/2022. I have reviewed the chart and agree that the record accurately reflects my personal performance of the history, physical exam, assessment and plan. I have also personally directed, reviewed, and agreed with the chart.

## 2022-12-05 NOTE — HISTORY OF PRESENT ILLNESS
[Patient reported mammogram was normal] : Patient reported mammogram was normal [Patient reported PAP Smear was abnormal] : Patient reported PAP Smear was abnormal [Patient reported bone density results were abnormal] : Patient reported bone density results were abnormal [FreeTextEntry1] : 68 yo  postmenopausal female presents annua and to reassess her atrophic vaginitis. Last seen 8/10/22 by JS for atrophic vaginitis and started on vaginal estradiol. At that time, pt was referred to breast surgeon, cardiologist, and endocrinologist. Pt reports vaginal dryness have vastly improved.\par \par GynHx: ab pap\par PmHx: angioedema, endometriosis \par PsHx: ovarian cystectomy, \par FamHx: malignant neoplasm of thyroid (sibling), myocardial infarction (father) \par current meds: hydrOXYzine HCl\par NKDA \par  [Mammogramdate] : 2/22 [TextBox_19] : BIRADS-2 [PapSmeardate] : 11/21 [TextBox_31] : atrophic vaginitis, HPV neg [BoneDensityDate] : 2/22 [TextBox_37] : osteoporosis

## 2022-12-05 NOTE — PLAN
[FreeTextEntry1] : 68 yo  postmenopausal female for annual, stable.\par \par HCM\par -pap next year\par -vit D/exercise/calcium\par -BMD per endocrine \par -breast mammo/sono\par -colon screening reviewed\par -f/u PCP for annual and appropriate immunizations\par -rto 1 year for annual exam\par \par atrophic vaginitis\par -cont estradiol as her dryness and dyspareunia have improved

## 2022-12-07 ENCOUNTER — OFFICE (OUTPATIENT)
Dept: URBAN - METROPOLITAN AREA CLINIC 90 | Facility: CLINIC | Age: 67
Setting detail: OPHTHALMOLOGY
End: 2022-12-07
Payer: MEDICARE

## 2022-12-07 DIAGNOSIS — H40.013: ICD-10-CM

## 2022-12-07 DIAGNOSIS — H25.13: ICD-10-CM

## 2022-12-07 DIAGNOSIS — H18.513: ICD-10-CM

## 2022-12-07 DIAGNOSIS — H16.223: ICD-10-CM

## 2022-12-07 PROCEDURE — 92014 COMPRE OPH EXAM EST PT 1/>: CPT | Performed by: OPHTHALMOLOGY

## 2022-12-07 ASSESSMENT — REFRACTION_CURRENTRX
OS_VPRISM_DIRECTION: SV
OS_SPHERE: -5.25
OD_SPHERE: -5.25
OS_SPHERE: -3.00
OS_OVR_VA: 20/
OD_CYLINDER: -1.25
OD_VPRISM_DIRECTION: SV
OD_AXIS: 034
OS_AXIS: 153
OS_VPRISM_DIRECTION: SV
OD_SPHERE: -3.00
OS_CYLINDER: -1.75
OD_AXIS: 032
OD_VPRISM_DIRECTION: SV
OD_CYLINDER: -1.25
OD_OVR_VA: 20/
OS_CYLINDER: -1.75
OS_AXIS: 155
OS_OVR_VA: 20/
OD_OVR_VA: 20/

## 2022-12-07 ASSESSMENT — CONFRONTATIONAL VISUAL FIELD TEST (CVF)
OD_FINDINGS: FULL
OS_FINDINGS: FULL

## 2022-12-07 ASSESSMENT — KERATOMETRY
OD_AXISANGLE_DEGREES: 099
METHOD_AUTO_MANUAL: AUTO
OD_K2POWER_DIOPTERS: 46.00
OS_K1POWER_DIOPTERS: 43.75
OS_AXISANGLE_DEGREES: 081
OD_K1POWER_DIOPTERS: 44.00
OS_K2POWER_DIOPTERS: 45.75

## 2022-12-07 ASSESSMENT — TONOMETRY
OS_IOP_MMHG: 14
OD_IOP_MMHG: 14

## 2022-12-07 ASSESSMENT — SUPERFICIAL PUNCTATE KERATITIS (SPK)
OS_SPK: ABSENT
OD_SPK: ABSENT

## 2022-12-07 ASSESSMENT — SPHEQUIV_DERIVED
OS_SPHEQUIV: -5.75
OD_SPHEQUIV: -6.25

## 2022-12-07 ASSESSMENT — VISUAL ACUITY
OS_BCVA: 20/30+2
OD_BCVA: 20/25-1

## 2022-12-07 ASSESSMENT — REFRACTION_AUTOREFRACTION
OS_SPHERE: -5.00
OD_SPHERE: -5.50
OD_AXIS: 028
OS_AXIS: 152
OS_CYLINDER: -1.50
OD_CYLINDER: -1.50

## 2022-12-07 ASSESSMENT — AXIALLENGTH_DERIVED
OD_AL: 25.6395
OS_AL: 25.5184

## 2022-12-07 ASSESSMENT — CORNEAL DYSTROPHY - POSTERIOR
OS_POSTERIORDYSTROPHY: T GUTTATA
OD_POSTERIORDYSTROPHY: T GUTTATA

## 2022-12-07 ASSESSMENT — PACHYMETRY
OD_CT_UM: 576
OS_CT_CORRECTION: -2
OD_CT_CORRECTION: -2
OS_CT_UM: 577

## 2022-12-15 ENCOUNTER — APPOINTMENT (OUTPATIENT)
Dept: OTOLARYNGOLOGY | Facility: CLINIC | Age: 67
End: 2022-12-15

## 2022-12-15 VITALS
HEIGHT: 62 IN | BODY MASS INDEX: 23.55 KG/M2 | DIASTOLIC BLOOD PRESSURE: 71 MMHG | TEMPERATURE: 97.8 F | SYSTOLIC BLOOD PRESSURE: 119 MMHG | WEIGHT: 128 LBS | HEART RATE: 73 BPM

## 2022-12-15 DIAGNOSIS — R09.82 POSTNASAL DRIP: ICD-10-CM

## 2022-12-15 DIAGNOSIS — H61.23 IMPACTED CERUMEN, BILATERAL: ICD-10-CM

## 2022-12-15 DIAGNOSIS — H69.81 OTHER SPECIFIED DISORDERS OF EUSTACHIAN TUBE, RIGHT EAR: ICD-10-CM

## 2022-12-15 DIAGNOSIS — H90.3 SENSORINEURAL HEARING LOSS, BILATERAL: ICD-10-CM

## 2022-12-15 PROCEDURE — 69210 REMOVE IMPACTED EAR WAX UNI: CPT

## 2022-12-15 PROCEDURE — 92567 TYMPANOMETRY: CPT

## 2022-12-15 PROCEDURE — 99204 OFFICE O/P NEW MOD 45 MIN: CPT | Mod: 25

## 2022-12-15 PROCEDURE — 31231 NASAL ENDOSCOPY DX: CPT

## 2022-12-15 PROCEDURE — 92557 COMPREHENSIVE HEARING TEST: CPT

## 2022-12-15 NOTE — END OF VISIT
[FreeTextEntry3] : I, Dr. Morgan personally performed the evaluation and management (E/M) services including all necessary procedures, for this new patient. That E/M includes conducting the clinically appropriate initial history &/or exam, assessing all conditions, and establishing the plan of care. Today, my [facility], Ashley Hurt, was here to observe &/or participate in the visit & follow plan of care established by me.\par \par \par

## 2022-12-15 NOTE — ASSESSMENT
[FreeTextEntry1] : Patient 67-year-old female last seen back in 2018 complaining of some diminished hearing or discomfort in the right ear after a flight on examination massive cerumen impaction in the right ear which was curetted out audiogram was performed showed an asymmetrical hearing loss left ear the right ear significantly worse than the right left ear.  Sent her for an MRI to rule out an unlikely acoustic neuroma I would suggest she consider trying a hearing aid she will be cleared if the MRI is negative otherwise she should follow-up annually so we can monitor her hearing.  Endoscopically nasally everything was fine she has a deviated septum to the right a little bit of congestion but not bad enough to try a nasal spray at this time. no

## 2022-12-15 NOTE — CONSULT LETTER
[Dear  ___] : Dear  [unfilled], [Consult Letter:] : I had the pleasure of evaluating your patient, [unfilled]. [Please see my note below.] : Please see my note below. [Consult Closing:] : Thank you very much for allowing me to participate in the care of this patient.  If you have any questions, please do not hesitate to contact me. [Sincerely,] : Sincerely, [FreeTextEntry3] : Bj Morgan MD\par [facility]\par Otolaryngology and Facial Plastics\par Associated Professor, [employer]\par

## 2022-12-15 NOTE — REVIEW OF SYSTEMS
[Post Nasal Drip] : post nasal drip [Ear Pain] : ear pain [Hearing Loss] : hearing loss [Negative] : Heme/Lymph

## 2022-12-15 NOTE — HISTORY OF PRESENT ILLNESS
[de-identified] : Patient returned from vacation a few weeks ago and a few days after landing she had right ear discomfort that has persisted. She denies ringing in the ear or dizziness. \par She has had some clogging of the right ear as well since this started. \par She has not been seen by anyone for this yet. she breaux snot have any nasal congestion or runny nose and denies these issues when she started to have the issue. \par She has mild postnasal drip

## 2023-01-11 ENCOUNTER — APPOINTMENT (OUTPATIENT)
Dept: MRI IMAGING | Facility: CLINIC | Age: 68
End: 2023-01-11
Payer: MEDICARE

## 2023-01-11 ENCOUNTER — OUTPATIENT (OUTPATIENT)
Dept: OUTPATIENT SERVICES | Facility: HOSPITAL | Age: 68
LOS: 1 days | End: 2023-01-11
Payer: MEDICARE

## 2023-01-11 DIAGNOSIS — H90.3 SENSORINEURAL HEARING LOSS, BILATERAL: ICD-10-CM

## 2023-01-11 PROCEDURE — A9585: CPT

## 2023-01-11 PROCEDURE — 70553 MRI BRAIN STEM W/O & W/DYE: CPT | Mod: 26,MH

## 2023-01-11 PROCEDURE — 70553 MRI BRAIN STEM W/O & W/DYE: CPT

## 2023-01-13 ENCOUNTER — NON-APPOINTMENT (OUTPATIENT)
Age: 68
End: 2023-01-13

## 2023-01-18 ENCOUNTER — APPOINTMENT (OUTPATIENT)
Dept: CT IMAGING | Facility: CLINIC | Age: 68
End: 2023-01-18

## 2023-01-18 ENCOUNTER — OUTPATIENT (OUTPATIENT)
Dept: OUTPATIENT SERVICES | Facility: HOSPITAL | Age: 68
LOS: 1 days | End: 2023-01-18
Payer: MEDICARE

## 2023-01-18 DIAGNOSIS — H90.3 SENSORINEURAL HEARING LOSS, BILATERAL: ICD-10-CM

## 2023-01-18 PROCEDURE — 70480 CT ORBIT/EAR/FOSSA W/O DYE: CPT

## 2023-01-18 PROCEDURE — 70480 CT ORBIT/EAR/FOSSA W/O DYE: CPT | Mod: 26,MH

## 2023-01-31 ENCOUNTER — NON-APPOINTMENT (OUTPATIENT)
Age: 68
End: 2023-01-31

## 2023-03-01 ENCOUNTER — OUTPATIENT (OUTPATIENT)
Dept: OUTPATIENT SERVICES | Facility: HOSPITAL | Age: 68
LOS: 1 days | End: 2023-03-01
Payer: MEDICARE

## 2023-03-01 ENCOUNTER — RESULT REVIEW (OUTPATIENT)
Age: 68
End: 2023-03-01

## 2023-03-01 ENCOUNTER — APPOINTMENT (OUTPATIENT)
Dept: ULTRASOUND IMAGING | Facility: IMAGING CENTER | Age: 68
End: 2023-03-01
Payer: MEDICARE

## 2023-03-01 ENCOUNTER — APPOINTMENT (OUTPATIENT)
Dept: MAMMOGRAPHY | Facility: IMAGING CENTER | Age: 68
End: 2023-03-01
Payer: MEDICARE

## 2023-03-01 DIAGNOSIS — Z00.8 ENCOUNTER FOR OTHER GENERAL EXAMINATION: ICD-10-CM

## 2023-03-01 PROCEDURE — 77067 SCR MAMMO BI INCL CAD: CPT | Mod: 26

## 2023-03-01 PROCEDURE — 77067 SCR MAMMO BI INCL CAD: CPT

## 2023-03-01 PROCEDURE — 77063 BREAST TOMOSYNTHESIS BI: CPT | Mod: 26

## 2023-03-01 PROCEDURE — 76641 ULTRASOUND BREAST COMPLETE: CPT | Mod: 26,50,GA

## 2023-03-01 PROCEDURE — 76641 ULTRASOUND BREAST COMPLETE: CPT

## 2023-03-01 PROCEDURE — 77063 BREAST TOMOSYNTHESIS BI: CPT

## 2023-03-07 ENCOUNTER — APPOINTMENT (OUTPATIENT)
Dept: OTOLARYNGOLOGY | Facility: CLINIC | Age: 68
End: 2023-03-07
Payer: MEDICARE

## 2023-03-07 VITALS
WEIGHT: 130 LBS | SYSTOLIC BLOOD PRESSURE: 111 MMHG | HEIGHT: 62 IN | HEART RATE: 89 BPM | BODY MASS INDEX: 23.92 KG/M2 | DIASTOLIC BLOOD PRESSURE: 76 MMHG | TEMPERATURE: 97.8 F

## 2023-03-07 DIAGNOSIS — H90.3 SENSORINEURAL HEARING LOSS, BILATERAL: ICD-10-CM

## 2023-03-07 DIAGNOSIS — H61.23 IMPACTED CERUMEN, BILATERAL: ICD-10-CM

## 2023-03-07 PROCEDURE — 69210 REMOVE IMPACTED EAR WAX UNI: CPT

## 2023-03-07 PROCEDURE — 99214 OFFICE O/P EST MOD 30 MIN: CPT | Mod: 25

## 2023-03-07 NOTE — ASSESSMENT
[FreeTextEntry1] : Follows up has a history of an asymmetrical hearing loss was sent for an MRI to rule out an acoustic neuroma there was suggestion of labyrinthitis ossificans CAT scan was recommended and performed this was ruled out no further intervention is indicated she follows up today CAT scans were shown and explained that she had some wax in her right ear that reaccumulated removed she will follow-up and see us in 6 months.

## 2023-03-07 NOTE — HISTORY OF PRESENT ILLNESS
[de-identified] : Patient with a known asymmetrical hearing loss, right worse than left. She has had mild occasional pain in the ears that come and go, worse on the right. She isnt sure if this is her allergies because when she takes an allergy pill it does improve the ear clogging and discomfort. \par She has not had any ringing in the ears or dizziness.

## 2023-03-08 ENCOUNTER — APPOINTMENT (OUTPATIENT)
Dept: OBGYN | Facility: CLINIC | Age: 68
End: 2023-03-08
Payer: MEDICARE

## 2023-03-08 VITALS
HEART RATE: 90 BPM | SYSTOLIC BLOOD PRESSURE: 112 MMHG | BODY MASS INDEX: 23.37 KG/M2 | DIASTOLIC BLOOD PRESSURE: 70 MMHG | RESPIRATION RATE: 14 BRPM | OXYGEN SATURATION: 96 % | WEIGHT: 127 LBS | HEIGHT: 62 IN

## 2023-03-08 DIAGNOSIS — R92.2 INCONCLUSIVE MAMMOGRAM: ICD-10-CM

## 2023-03-08 PROCEDURE — 99213 OFFICE O/P EST LOW 20 MIN: CPT

## 2023-03-08 NOTE — HISTORY OF PRESENT ILLNESS
[FreeTextEntry1] : 67 yo  postmenopausal female presents for follow up on recent breast mammo/sono. BIRADS-3 mammo 3/1/23 revealed cysts on right breast and pt was recommended 6 month follow-up. Pt has seen breast surgeon Dr. Biggs in the past, pre-pandemic.\par \par GynHx: ab pap\par PmHx: angioedema, endometriosis \par PsHx: ovarian cystectomy, \par FamHx: malignant neoplasm of thyroid (sibling), myocardial infarction (father) \par current meds: hydrOXYzine HCl\par NKDA \par \par \par 3/1/23 Mammography:\par RIGHT BREAST FINDINGS:\par The breast parenchyma demonstrates a heterogeneous background echotexture (mixed fatty and fibroglandular).\par - 12:00, 5 cm from the nipple is a stable 0.5 cm cyst.\par - 1:00, retroareolar oval hypoechoic circumscribe. mass which may represent a complicated cyst measuring 0.3 x 0.3 x 0.1 cm, new. 6 month followup\par - 10:00, 6 cm from the nipple lobulated hypoechoic mass measuring 0.7 x 0.6 x 0.3 cm, stable.\par \par

## 2023-03-08 NOTE — PLAN
[FreeTextEntry1] : 67 yo  postmenopausal female with new right breast cyst, BIRADS-3 mammo/sono but does not appear suspicious.\par \par -repeat breast mammo/sono in 6 months\par -referral back to breast surgeon for second opinion regarding possibility of earlier imaging v. biopsy, although at this point not suspicious appearing per imaging read\par

## 2023-03-08 NOTE — END OF VISIT
[Time Spent: ___ minutes] : I have spent [unfilled] minutes of time on the encounter. [FreeTextEntry3] : I, Kushal Mejia, acted as a scribe on behalf of Dr. Robert Mccurdy on 03/08/2023 .\par \par All medical entries made by the scribe were at my, Dr. Robert Mccurdy's, direction and personally dictated by me on 03/08/2023. I have reviewed the chart and agree that the record accurately reflects my personal performance of the history, physical exam, assessment and plan. I have also personally directed, reviewed, and agreed with the chart.

## 2023-04-10 ENCOUNTER — OUTPATIENT (OUTPATIENT)
Dept: OUTPATIENT SERVICES | Facility: HOSPITAL | Age: 68
LOS: 1 days | End: 2023-04-10
Payer: MEDICARE

## 2023-04-10 ENCOUNTER — APPOINTMENT (OUTPATIENT)
Dept: ULTRASOUND IMAGING | Facility: CLINIC | Age: 68
End: 2023-04-10
Payer: MEDICARE

## 2023-04-10 DIAGNOSIS — E04.2 NONTOXIC MULTINODULAR GOITER: ICD-10-CM

## 2023-04-10 PROBLEM — R92.8 ABNORMAL MAMMOGRAM: Status: ACTIVE | Noted: 2018-11-15

## 2023-04-10 PROCEDURE — 76536 US EXAM OF HEAD AND NECK: CPT | Mod: 26

## 2023-04-10 PROCEDURE — 76536 US EXAM OF HEAD AND NECK: CPT

## 2023-04-11 ENCOUNTER — RESULT REVIEW (OUTPATIENT)
Age: 68
End: 2023-04-11

## 2023-04-11 ENCOUNTER — APPOINTMENT (OUTPATIENT)
Dept: SURGICAL ONCOLOGY | Facility: CLINIC | Age: 68
End: 2023-04-11
Payer: MEDICARE

## 2023-04-11 ENCOUNTER — NON-APPOINTMENT (OUTPATIENT)
Age: 68
End: 2023-04-11

## 2023-04-11 VITALS
OXYGEN SATURATION: 99 % | BODY MASS INDEX: 23.55 KG/M2 | DIASTOLIC BLOOD PRESSURE: 72 MMHG | RESPIRATION RATE: 16 BRPM | HEIGHT: 62 IN | WEIGHT: 128 LBS | SYSTOLIC BLOOD PRESSURE: 106 MMHG | HEART RATE: 80 BPM

## 2023-04-11 DIAGNOSIS — R92.8 OTHER ABNORMAL AND INCONCLUSIVE FINDINGS ON DIAGNOSTIC IMAGING OF BREAST: ICD-10-CM

## 2023-04-11 PROCEDURE — 99213 OFFICE O/P EST LOW 20 MIN: CPT

## 2023-04-11 NOTE — ASSESSMENT
[FreeTextEntry1] : IMP:\par Alma Rosa is a 68 y.o female with FH + BC in her mother. \par MMG/US 3/2023- BI-RADS 3\par \par Plan:\par Return for Right breast US Sept 2023\par Return as needed\par \par All medical entries were at my, Dr. Deshaun Biggs, direction. I have reviewed the chart and agree that the record accurately reflects my personal performance of the history, physical exam, assessment and plan. Our office Nurse Practitioner was present of the duration of the office visit.

## 2023-04-11 NOTE — PHYSICAL EXAM
[Normal Supraclavicular Lymph Nodes] : normal supraclavicular lymph nodes [Normal Axillary Lymph Nodes] : normal axillary lymph nodes [Normal] : normal lips, teeth and gums  [Normal] : supple, no neck mass and thyroid not enlarged [FreeTextEntry1] : SC present for exam  [de-identified] : Normal S1,S2. Regular rate and rhythm  [de-identified] : Complete breast exam performed in supine and upright position. No palpable masses, tenderness, nipple discharge, inversion, deviation or enlarged axillary or supraclavicular lymph nodes bilaterally.  [de-identified] : Clear breath sounds bilaterally with normal respiratory effort.

## 2023-04-11 NOTE — CONSULT LETTER
[Dear  ___] : Dear ~DALY, [Consult Letter:] : I had the pleasure of evaluating your patient, [unfilled]. [Please see my note below.] : Please see my note below. [Consult Closing:] : Thank you very much for allowing me to participate in the care of this patient.  If you have any questions, please do not hesitate to contact me. [Sincerely,] : Sincerely, [FreeTextEntry2] : Dr. Robert Mccurdy  [FreeTextEntry3] : Deshaun Biggs

## 2023-04-11 NOTE — HISTORY OF PRESENT ILLNESS
[de-identified] : Ms. Alma Rosa Awan is a 68 year old female who presents for an initial consultation for abnormal breast imaging, referred by Dr. Robert Mccurdy.\par \par history of left breast biopsy in 2018- benign\par Family history of breast cancer in her mother\par \par Screening MMG/US on 3/1/23 revealed a new probable benign finding right breast on ultrasound at 1:00, retroareolar region as described above. Six-month follow-up targeted ultrasound recommended to ascertain sonographic stability. BI-RADS 3\par \par Today, She denies palpable breast masses, nipple discharge, skin changes, inversion or breast pain. Denies constitutional symptoms. No health changes.

## 2023-05-31 ENCOUNTER — OFFICE (OUTPATIENT)
Dept: URBAN - METROPOLITAN AREA CLINIC 90 | Facility: CLINIC | Age: 68
Setting detail: OPHTHALMOLOGY
End: 2023-05-31
Payer: MEDICARE

## 2023-05-31 DIAGNOSIS — H35.40: ICD-10-CM

## 2023-05-31 DIAGNOSIS — H40.013: ICD-10-CM

## 2023-05-31 DIAGNOSIS — H16.223: ICD-10-CM

## 2023-05-31 DIAGNOSIS — H25.13: ICD-10-CM

## 2023-05-31 DIAGNOSIS — H35.371: ICD-10-CM

## 2023-05-31 DIAGNOSIS — H43.391: ICD-10-CM

## 2023-05-31 DIAGNOSIS — H52.13: ICD-10-CM

## 2023-05-31 DIAGNOSIS — H52.7: ICD-10-CM

## 2023-05-31 DIAGNOSIS — H18.513: ICD-10-CM

## 2023-05-31 PROCEDURE — 92014 COMPRE OPH EXAM EST PT 1/>: CPT | Performed by: OPHTHALMOLOGY

## 2023-05-31 PROCEDURE — 92083 EXTENDED VISUAL FIELD XM: CPT | Performed by: OPHTHALMOLOGY

## 2023-05-31 PROCEDURE — 92015 DETERMINE REFRACTIVE STATE: CPT | Performed by: OPHTHALMOLOGY

## 2023-05-31 PROCEDURE — 92250 FUNDUS PHOTOGRAPHY W/I&R: CPT | Performed by: OPHTHALMOLOGY

## 2023-05-31 ASSESSMENT — REFRACTION_CURRENTRX
OS_SPHERE: -3.00
OS_OVR_VA: 20/
OD_AXIS: 034
OS_AXIS: 155
OD_SPHERE: -3.00
OS_CYLINDER: -1.75
OS_OVR_VA: 20/
OD_VPRISM_DIRECTION: SV
OD_OVR_VA: 20/
OD_CYLINDER: -1.25
OD_OVR_VA: 20/
OD_CYLINDER: -1.25
OS_CYLINDER: -1.75
OD_SPHERE: -5.25
OD_AXIS: 032
OS_VPRISM_DIRECTION: SV
OS_SPHERE: -5.25
OD_VPRISM_DIRECTION: SV
OS_AXIS: 146
OS_VPRISM_DIRECTION: SV

## 2023-05-31 ASSESSMENT — REFRACTION_MANIFEST
OD_ADD: +2.50
OD_CYLINDER: -1.00
OD_VA1: 20/30-2 SLOW
OS_CYLINDER: -1.75
OD_SPHERE: -5.25
OS_VA1: 20/30-2 SLOW
OD_AXIS: 030
OD_SPHERE: -5.25
OD_AXIS: 030
OS_VA1: 20/30-2 SLOW
OS_AXIS: 150
OS_ADD: +2.50
OS_CYLINDER: -1.75
OS_AXIS: 150
OS_VA2: 20/25(J1)-
OS_SPHERE: -5.50
OS_SPHERE: -5.50
OD_CYLINDER: -1.00
OD_VA2: 20/25(J1)-
OD_VA1: 20/30-2 SLOW

## 2023-05-31 ASSESSMENT — PACHYMETRY
OD_CT_UM: 576
OS_CT_CORRECTION: -2
OS_CT_UM: 577
OD_CT_CORRECTION: -2

## 2023-05-31 ASSESSMENT — SUPERFICIAL PUNCTATE KERATITIS (SPK)
OS_SPK: ABSENT
OD_SPK: ABSENT

## 2023-05-31 ASSESSMENT — SPHEQUIV_DERIVED
OD_SPHEQUIV: -5.75
OS_SPHEQUIV: -6.375
OD_SPHEQUIV: -5.75
OS_SPHEQUIV: -6.125
OS_SPHEQUIV: -6.375
OD_SPHEQUIV: -5.75

## 2023-05-31 ASSESSMENT — REFRACTION_AUTOREFRACTION
OS_SPHERE: -5.25
OD_AXIS: 028
OS_AXIS: 151
OS_CYLINDER: -1.75
OD_SPHERE: -5.25
OD_CYLINDER: -1.00

## 2023-05-31 ASSESSMENT — VISUAL ACUITY
OS_BCVA: 20/30+2
OD_BCVA: 20/25-1

## 2023-05-31 ASSESSMENT — TONOMETRY
OS_IOP_MMHG: 18
OD_IOP_MMHG: 16

## 2023-05-31 ASSESSMENT — CONFRONTATIONAL VISUAL FIELD TEST (CVF)
OD_FINDINGS: FULL
OS_FINDINGS: FULL

## 2023-05-31 ASSESSMENT — CORNEAL DYSTROPHY - POSTERIOR
OD_POSTERIORDYSTROPHY: MILD GUTTATA
OS_POSTERIORDYSTROPHY: MILD GUTTATA

## 2023-09-08 ENCOUNTER — OUTPATIENT (OUTPATIENT)
Dept: OUTPATIENT SERVICES | Facility: HOSPITAL | Age: 68
LOS: 1 days | End: 2023-09-08
Payer: MEDICARE

## 2023-09-08 ENCOUNTER — APPOINTMENT (OUTPATIENT)
Dept: ULTRASOUND IMAGING | Facility: IMAGING CENTER | Age: 68
End: 2023-09-08
Payer: MEDICARE

## 2023-09-08 ENCOUNTER — RESULT REVIEW (OUTPATIENT)
Age: 68
End: 2023-09-08

## 2023-09-08 DIAGNOSIS — R92.8 OTHER ABNORMAL AND INCONCLUSIVE FINDINGS ON DIAGNOSTIC IMAGING OF BREAST: ICD-10-CM

## 2023-09-08 PROCEDURE — 76642 ULTRASOUND BREAST LIMITED: CPT | Mod: 26,RT

## 2023-09-08 PROCEDURE — 76642 ULTRASOUND BREAST LIMITED: CPT

## 2024-02-26 DIAGNOSIS — Z12.39 ENCOUNTER FOR OTHER SCREENING FOR MALIGNANT NEOPLASM OF BREAST: ICD-10-CM

## 2024-04-05 ENCOUNTER — APPOINTMENT (OUTPATIENT)
Dept: MAMMOGRAPHY | Facility: IMAGING CENTER | Age: 69
End: 2024-04-05
Payer: MEDICARE

## 2024-04-05 ENCOUNTER — RESULT REVIEW (OUTPATIENT)
Age: 69
End: 2024-04-05

## 2024-04-05 ENCOUNTER — OUTPATIENT (OUTPATIENT)
Dept: OUTPATIENT SERVICES | Facility: HOSPITAL | Age: 69
LOS: 1 days | End: 2024-04-05
Payer: MEDICARE

## 2024-04-05 ENCOUNTER — APPOINTMENT (OUTPATIENT)
Dept: ULTRASOUND IMAGING | Facility: IMAGING CENTER | Age: 69
End: 2024-04-05
Payer: MEDICARE

## 2024-04-05 DIAGNOSIS — Z12.39 ENCOUNTER FOR OTHER SCREENING FOR MALIGNANT NEOPLASM OF BREAST: ICD-10-CM

## 2024-04-05 DIAGNOSIS — R92.30 DENSE BREASTS, UNSPECIFIED: ICD-10-CM

## 2024-04-05 PROCEDURE — 76641 ULTRASOUND BREAST COMPLETE: CPT

## 2024-04-05 PROCEDURE — 77063 BREAST TOMOSYNTHESIS BI: CPT | Mod: 26

## 2024-04-05 PROCEDURE — 76641 ULTRASOUND BREAST COMPLETE: CPT | Mod: 26,50,GY

## 2024-04-05 PROCEDURE — 77067 SCR MAMMO BI INCL CAD: CPT

## 2024-04-05 PROCEDURE — 77067 SCR MAMMO BI INCL CAD: CPT | Mod: 26

## 2024-04-05 PROCEDURE — 77063 BREAST TOMOSYNTHESIS BI: CPT

## 2024-04-10 ENCOUNTER — APPOINTMENT (OUTPATIENT)
Dept: OBGYN | Facility: CLINIC | Age: 69
End: 2024-04-10
Payer: MEDICARE

## 2024-04-10 VITALS
DIASTOLIC BLOOD PRESSURE: 82 MMHG | WEIGHT: 127 LBS | HEART RATE: 76 BPM | BODY MASS INDEX: 23.37 KG/M2 | SYSTOLIC BLOOD PRESSURE: 118 MMHG | HEIGHT: 62 IN

## 2024-04-10 DIAGNOSIS — Z13.820 ENCOUNTER FOR SCREENING FOR OSTEOPOROSIS: ICD-10-CM

## 2024-04-10 PROCEDURE — G0101: CPT

## 2024-04-28 LAB — HPV HIGH+LOW RISK DNA PNL CVX: NOT DETECTED

## 2024-04-28 NOTE — HISTORY OF PRESENT ILLNESS
[FreeTextEntry1] : 70 yo  postmenopausal female presents annual visit. pt complains of vaginal discomfort, itchiness, burning sensation during urination   GynHx: ab pap PmHx: angioedema, endometriosis PsHx: ovarian cystectomy FamHx: malignant neoplasm of thyroid (sibling), myocardial infarction (father) current meds: hydrOXYzine HCl Allergies: NKDA

## 2024-04-28 NOTE — PHYSICAL EXAM
[Appropriately responsive] : appropriately responsive [Alert] : alert [No Acute Distress] : no acute distress [No Lymphadenopathy] : no lymphadenopathy [Soft] : soft [Non-tender] : non-tender [Non-distended] : non-distended [No HSM] : No HSM [No Lesions] : no lesions [No Mass] : no mass [Oriented x3] : oriented x3 [Examination Of The Breasts] : a normal appearance [No Masses] : no breast masses were palpable [Labia Majora] : normal [Labia Minora] : normal [Normal] : normal [Uterine Adnexae] : normal [FreeTextEntry3] : no thyromegaly [Atrophy] : atrophy

## 2024-04-28 NOTE — PLAN
[FreeTextEntry1] : 68 yo  postmenopausal female for annual, stable.  HCM -pap next year -vit D/exercise/calcium -BMD per endocrine -breast mammo/sono up to date -colon screening reviewed -f/u PCP for annual and appropriate immunizations -rto 1 year for annual exam  Right breast cyst -follows with Breast surgeon  Atrophic vaginitis -estradiol  has been rx in past will refill if wants in future  osteoporosis -dexa -f/u endocrine

## 2024-05-01 LAB — CYTOLOGY CVX/VAG DOC THIN PREP: ABNORMAL

## 2024-07-24 ENCOUNTER — OFFICE (OUTPATIENT)
Dept: URBAN - METROPOLITAN AREA CLINIC 90 | Facility: CLINIC | Age: 69
Setting detail: OPHTHALMOLOGY
End: 2024-07-24
Payer: MEDICARE

## 2024-07-24 DIAGNOSIS — H40.013: ICD-10-CM

## 2024-07-24 DIAGNOSIS — H01.004: ICD-10-CM

## 2024-07-24 DIAGNOSIS — H25.13: ICD-10-CM

## 2024-07-24 DIAGNOSIS — H16.223: ICD-10-CM

## 2024-07-24 DIAGNOSIS — H35.371: ICD-10-CM

## 2024-07-24 DIAGNOSIS — H43.391: ICD-10-CM

## 2024-07-24 DIAGNOSIS — H10.45: ICD-10-CM

## 2024-07-24 DIAGNOSIS — H18.513: ICD-10-CM

## 2024-07-24 DIAGNOSIS — H01.001: ICD-10-CM

## 2024-07-24 PROCEDURE — 92014 COMPRE OPH EXAM EST PT 1/>: CPT | Performed by: OPHTHALMOLOGY

## 2024-07-24 PROCEDURE — 92133 CPTRZD OPH DX IMG PST SGM ON: CPT | Performed by: OPHTHALMOLOGY

## 2024-07-24 PROCEDURE — 92083 EXTENDED VISUAL FIELD XM: CPT | Performed by: OPHTHALMOLOGY

## 2024-07-24 ASSESSMENT — CONFRONTATIONAL VISUAL FIELD TEST (CVF)
OD_FINDINGS: FULL
OS_FINDINGS: FULL

## 2024-10-10 DIAGNOSIS — Z12.31 ENCOUNTER FOR SCREENING MAMMOGRAM FOR MALIGNANT NEOPLASM OF BREAST: ICD-10-CM

## 2025-01-15 ENCOUNTER — OFFICE (OUTPATIENT)
Facility: LOCATION | Age: 70
Setting detail: OPHTHALMOLOGY
End: 2025-01-15
Payer: MEDICARE

## 2025-01-15 DIAGNOSIS — H16.223: ICD-10-CM

## 2025-01-15 DIAGNOSIS — H43.813: ICD-10-CM

## 2025-01-15 DIAGNOSIS — H18.513: ICD-10-CM

## 2025-01-15 DIAGNOSIS — H25.13: ICD-10-CM

## 2025-01-15 DIAGNOSIS — H01.001: ICD-10-CM

## 2025-01-15 DIAGNOSIS — H18.591: ICD-10-CM

## 2025-01-15 DIAGNOSIS — H18.592: ICD-10-CM

## 2025-01-15 DIAGNOSIS — H40.013: ICD-10-CM

## 2025-01-15 DIAGNOSIS — H35.371: ICD-10-CM

## 2025-01-15 DIAGNOSIS — H01.004: ICD-10-CM

## 2025-01-15 DIAGNOSIS — H43.393: ICD-10-CM

## 2025-01-15 DIAGNOSIS — H10.45: ICD-10-CM

## 2025-01-15 PROBLEM — H18.593 UNSPECIFIED HEREDITARY CORNEAL DYSTROPHIES: Status: ACTIVE | Noted: 2025-01-15

## 2025-01-15 PROCEDURE — 92014 COMPRE OPH EXAM EST PT 1/>: CPT | Performed by: OPHTHALMOLOGY

## 2025-01-15 PROCEDURE — 92083 EXTENDED VISUAL FIELD XM: CPT | Performed by: OPHTHALMOLOGY

## 2025-01-15 ASSESSMENT — REFRACTION_MANIFEST
OS_VA2: 20/25(J1)-
OD_AXIS: 030
OD_SPHERE: -5.25
OS_VA1: 20/30-2 SLOW
OS_SPHERE: -5.50
OS_CYLINDER: -1.75
OD_SPHERE: -5.25
OD_ADD: +2.50
OD_CYLINDER: -1.00
OD_VA2: 20/25(J1)-
OD_VA1: 20/30-2 SLOW
OD_VA1: 20/30-2 SLOW
OS_SPHERE: -5.50
OS_AXIS: 150
OS_AXIS: 150
OS_CYLINDER: -1.75
OS_ADD: +2.50
OS_VA1: 20/30-2 SLOW
OD_AXIS: 030
OD_CYLINDER: -1.00

## 2025-01-15 ASSESSMENT — REFRACTION_CURRENTRX
OS_VPRISM_DIRECTION: SV
OD_VPRISM_DIRECTION: SV
OD_AXIS: 029
OS_AXIS: 146
OD_VPRISM_DIRECTION: SV
OD_CYLINDER: -1.25
OS_VPRISM_DIRECTION: SV
OD_OVR_VA: 20/
OD_SPHERE: -3.00
OS_OVR_VA: 20/
OS_CYLINDER: -1.75
OS_SPHERE: -5.50
OD_AXIS: 034
OS_AXIS: 150
OD_AXIS: 031
OS_AXIS: 152
OD_SPHERE: -5.00
OS_OVR_VA: 20/
OS_OVR_VA: 20/
OS_CYLINDER: -2.00
OS_AXIS: 155
OD_VPRISM_DIRECTION: SV
OS_SPHERE: -3.00
OD_CYLINDER: -1.25
OS_SPHERE: -5.50
OD_VPRISM_DIRECTION: SV
OS_VPRISM_DIRECTION: SV
OD_SPHERE: -5.00
OD_OVR_VA: 20/
OD_AXIS: 032
OS_SPHERE: -5.25
OS_CYLINDER: -2.00
OS_CYLINDER: -1.75
OD_SPHERE: -5.25
OS_VPRISM_DIRECTION: SV
OD_OVR_VA: 20/
OD_CYLINDER: -1.25
OD_CYLINDER: -1.25

## 2025-01-15 ASSESSMENT — REFRACTION_AUTOREFRACTION
OD_SPHERE: -4.75
OD_AXIS: 139
OD_CYLINDER: -0.25
OS_SPHERE: -5.75
OS_AXIS: 152
OS_CYLINDER: -1.50

## 2025-01-15 ASSESSMENT — VISUAL ACUITY
OD_BCVA: 20/25-2
OS_BCVA: 20/30

## 2025-01-15 ASSESSMENT — CORNEAL DYSTROPHY - POSTERIOR
OD_POSTERIORDYSTROPHY: MILD GUTTATA
OS_POSTERIORDYSTROPHY: MILD GUTTATA

## 2025-01-15 ASSESSMENT — KERATOMETRY
OS_AXISANGLE_DEGREES: 079
METHOD_AUTO_MANUAL: AUTO
OD_AXISANGLE_DEGREES: 159
OS_K2POWER_DIOPTERS: 45.50
OD_K2POWER_DIOPTERS: 44.00
OD_K1POWER_DIOPTERS: 43.50
OS_K1POWER_DIOPTERS: 45.50

## 2025-01-15 ASSESSMENT — CONFRONTATIONAL VISUAL FIELD TEST (CVF)
OD_FINDINGS: FULL
OS_FINDINGS: FULL

## 2025-01-15 ASSESSMENT — PACHYMETRY
OS_CT_CORRECTION: -2
OS_CT_UM: 577
OD_CT_CORRECTION: -2
OD_CT_UM: 576

## 2025-01-15 ASSESSMENT — TONOMETRY: OD_IOP_MMHG: 20

## 2025-04-16 ENCOUNTER — OFFICE (OUTPATIENT)
Facility: LOCATION | Age: 70
Setting detail: OPHTHALMOLOGY
End: 2025-04-16
Payer: MEDICARE

## 2025-04-16 DIAGNOSIS — H25.13: ICD-10-CM

## 2025-04-16 DIAGNOSIS — H10.45: ICD-10-CM

## 2025-04-16 DIAGNOSIS — H18.591: ICD-10-CM

## 2025-04-16 DIAGNOSIS — H18.593: ICD-10-CM

## 2025-04-16 DIAGNOSIS — H40.013: ICD-10-CM

## 2025-04-16 DIAGNOSIS — H18.592: ICD-10-CM

## 2025-04-16 DIAGNOSIS — H01.004: ICD-10-CM

## 2025-04-16 DIAGNOSIS — H16.223: ICD-10-CM

## 2025-04-16 DIAGNOSIS — H18.513: ICD-10-CM

## 2025-04-16 DIAGNOSIS — H01.001: ICD-10-CM

## 2025-04-16 PROCEDURE — 92012 INTRM OPH EXAM EST PATIENT: CPT | Performed by: OPHTHALMOLOGY

## 2025-04-16 ASSESSMENT — REFRACTION_AUTOREFRACTION
OD_SPHERE: -4.75
OS_SPHERE: -5.75
OS_AXIS: 152
OD_AXIS: 139
OS_CYLINDER: -1.50
OD_CYLINDER: -0.25

## 2025-04-16 ASSESSMENT — CORNEAL DYSTROPHY - POSTERIOR
OS_POSTERIORDYSTROPHY: MILD GUTTATA
OD_POSTERIORDYSTROPHY: MILD GUTTATA

## 2025-04-16 ASSESSMENT — REFRACTION_MANIFEST
OD_SPHERE: -5.25
OD_CYLINDER: -1.00
OD_CYLINDER: -1.00
OS_VA1: 20/30-2 SLOW
OS_ADD: +2.50
OS_VA1: 20/30-2 SLOW
OS_CYLINDER: -1.75
OS_SPHERE: -5.50
OD_VA1: 20/30-2 SLOW
OS_AXIS: 150
OS_SPHERE: -5.50
OD_AXIS: 030
OD_SPHERE: -5.25
OS_CYLINDER: -1.75
OD_VA1: 20/30-2 SLOW
OS_VA2: 20/25(J1)-
OS_AXIS: 150
OD_VA2: 20/25(J1)-
OD_AXIS: 030
OD_ADD: +2.50

## 2025-04-16 ASSESSMENT — REFRACTION_CURRENTRX
OD_OVR_VA: 20/
OD_VPRISM_DIRECTION: SV
OS_SPHERE: -5.25
OD_VPRISM_DIRECTION: SV
OD_CYLINDER: -1.25
OD_AXIS: 034
OS_VPRISM_DIRECTION: SV
OS_SPHERE: -3.00
OD_SPHERE: -5.25
OS_AXIS: 146
OD_VPRISM_DIRECTION: SV
OD_OVR_VA: 20/
OD_CYLINDER: -1.25
OD_SPHERE: -5.00
OS_CYLINDER: -1.75
OS_CYLINDER: -2.00
OS_AXIS: 152
OD_SPHERE: -5.00
OD_CYLINDER: -1.25
OD_AXIS: 031
OS_OVR_VA: 20/
OD_AXIS: 029
OS_VPRISM_DIRECTION: SV
OD_SPHERE: -3.00
OD_AXIS: 032
OS_OVR_VA: 20/
OS_AXIS: 150
OS_VPRISM_DIRECTION: SV
OS_AXIS: 155
OD_CYLINDER: -1.25
OS_CYLINDER: -1.75
OD_OVR_VA: 20/
OS_OVR_VA: 20/
OD_VPRISM_DIRECTION: SV
OS_SPHERE: -5.50
OS_VPRISM_DIRECTION: SV
OS_SPHERE: -5.50
OS_CYLINDER: -2.00

## 2025-04-16 ASSESSMENT — KERATOMETRY
OD_K2POWER_DIOPTERS: 44.00
OS_AXISANGLE_DEGREES: 079
OS_K1POWER_DIOPTERS: 45.50
OD_K1POWER_DIOPTERS: 43.50
OD_AXISANGLE_DEGREES: 159
OS_K2POWER_DIOPTERS: 45.50

## 2025-04-16 ASSESSMENT — CONFRONTATIONAL VISUAL FIELD TEST (CVF)
OD_FINDINGS: FULL
OS_FINDINGS: FULL

## 2025-04-16 ASSESSMENT — PACHYMETRY
OS_CT_UM: 577
OD_CT_UM: 576
OD_CT_CORRECTION: -2
OS_CT_CORRECTION: -2

## 2025-04-16 ASSESSMENT — TONOMETRY
OD_IOP_MMHG: 17
OS_IOP_MMHG: 19

## 2025-04-16 ASSESSMENT — VISUAL ACUITY
OD_BCVA: 20/25+2
OS_BCVA: 20/30+1

## 2025-05-02 ENCOUNTER — APPOINTMENT (OUTPATIENT)
Dept: OBGYN | Facility: CLINIC | Age: 70
End: 2025-05-02
Payer: MEDICARE

## 2025-05-02 VITALS
HEIGHT: 62 IN | DIASTOLIC BLOOD PRESSURE: 73 MMHG | HEART RATE: 83 BPM | SYSTOLIC BLOOD PRESSURE: 110 MMHG | BODY MASS INDEX: 23.55 KG/M2 | WEIGHT: 128 LBS

## 2025-05-02 DIAGNOSIS — Z01.419 ENCOUNTER FOR GYNECOLOGICAL EXAMINATION (GENERAL) (ROUTINE) W/OUT ABNORMAL FINDINGS: ICD-10-CM

## 2025-05-02 PROCEDURE — G0101: CPT

## 2025-06-20 ENCOUNTER — APPOINTMENT (OUTPATIENT)
Dept: ULTRASOUND IMAGING | Facility: IMAGING CENTER | Age: 70
End: 2025-06-20
Payer: MEDICARE

## 2025-06-20 ENCOUNTER — APPOINTMENT (OUTPATIENT)
Dept: RADIOLOGY | Facility: IMAGING CENTER | Age: 70
End: 2025-06-20
Payer: MEDICARE

## 2025-06-20 ENCOUNTER — RESULT REVIEW (OUTPATIENT)
Age: 70
End: 2025-06-20

## 2025-06-20 ENCOUNTER — APPOINTMENT (OUTPATIENT)
Dept: ULTRASOUND IMAGING | Facility: IMAGING CENTER | Age: 70
End: 2025-06-20

## 2025-06-20 ENCOUNTER — APPOINTMENT (OUTPATIENT)
Dept: MAMMOGRAPHY | Facility: IMAGING CENTER | Age: 70
End: 2025-06-20
Payer: MEDICARE

## 2025-06-20 ENCOUNTER — OUTPATIENT (OUTPATIENT)
Dept: OUTPATIENT SERVICES | Facility: HOSPITAL | Age: 70
LOS: 1 days | End: 2025-06-20
Payer: MEDICARE

## 2025-06-20 DIAGNOSIS — Z13.820 ENCOUNTER FOR SCREENING FOR OSTEOPOROSIS: ICD-10-CM

## 2025-06-20 DIAGNOSIS — Z12.31 ENCOUNTER FOR SCREENING MAMMOGRAM FOR MALIGNANT NEOPLASM OF BREAST: ICD-10-CM

## 2025-06-20 DIAGNOSIS — R92.30 DENSE BREASTS, UNSPECIFIED: ICD-10-CM

## 2025-06-20 PROCEDURE — 77063 BREAST TOMOSYNTHESIS BI: CPT

## 2025-06-20 PROCEDURE — 76641 ULTRASOUND BREAST COMPLETE: CPT | Mod: 26,50,GA

## 2025-06-20 PROCEDURE — 77080 DXA BONE DENSITY AXIAL: CPT | Mod: 26

## 2025-06-20 PROCEDURE — 77067 SCR MAMMO BI INCL CAD: CPT | Mod: 26

## 2025-06-20 PROCEDURE — 77067 SCR MAMMO BI INCL CAD: CPT

## 2025-06-20 PROCEDURE — 76641 ULTRASOUND BREAST COMPLETE: CPT

## 2025-06-20 PROCEDURE — 76536 US EXAM OF HEAD AND NECK: CPT | Mod: 26

## 2025-06-20 PROCEDURE — 77063 BREAST TOMOSYNTHESIS BI: CPT | Mod: 26

## 2025-06-20 PROCEDURE — 77080 DXA BONE DENSITY AXIAL: CPT

## 2025-06-20 PROCEDURE — 76536 US EXAM OF HEAD AND NECK: CPT

## 2025-08-07 ENCOUNTER — APPOINTMENT (OUTPATIENT)
Dept: OTOLARYNGOLOGY | Facility: CLINIC | Age: 70
End: 2025-08-07
Payer: MEDICARE

## 2025-08-07 DIAGNOSIS — J34.2 DEVIATED NASAL SEPTUM: ICD-10-CM

## 2025-08-07 DIAGNOSIS — H90.3 SENSORINEURAL HEARING LOSS, BILATERAL: ICD-10-CM

## 2025-08-07 DIAGNOSIS — H61.23 IMPACTED CERUMEN, BILATERAL: ICD-10-CM

## 2025-08-07 PROCEDURE — G0268 REMOVAL OF IMPACTED WAX MD: CPT

## 2025-08-07 PROCEDURE — 92557 COMPREHENSIVE HEARING TEST: CPT

## 2025-08-07 PROCEDURE — 92567 TYMPANOMETRY: CPT

## 2025-08-07 PROCEDURE — 99213 OFFICE O/P EST LOW 20 MIN: CPT | Mod: 25

## 2025-08-07 PROCEDURE — 31231 NASAL ENDOSCOPY DX: CPT
